# Patient Record
Sex: FEMALE | Race: BLACK OR AFRICAN AMERICAN | NOT HISPANIC OR LATINO | Employment: PART TIME | URBAN - METROPOLITAN AREA
[De-identification: names, ages, dates, MRNs, and addresses within clinical notes are randomized per-mention and may not be internally consistent; named-entity substitution may affect disease eponyms.]

---

## 2019-05-27 ENCOUNTER — HOSPITAL ENCOUNTER (EMERGENCY)
Facility: CLINIC | Age: 34
Discharge: HOME | End: 2019-05-27
Attending: EMERGENCY MEDICINE | Admitting: EMERGENCY MEDICINE
Payer: COMMERCIAL

## 2019-05-27 VITALS
DIASTOLIC BLOOD PRESSURE: 61 MMHG | RESPIRATION RATE: 16 BRPM | WEIGHT: 179 LBS | HEART RATE: 103 BPM | SYSTOLIC BLOOD PRESSURE: 111 MMHG | TEMPERATURE: 98.5 F

## 2019-05-27 DIAGNOSIS — R05.9 COUGH: ICD-10-CM

## 2019-05-27 PROCEDURE — 99283 EMERGENCY DEPT VISIT LOW MDM: CPT | Mod: Z6 | Performed by: EMERGENCY MEDICINE

## 2019-05-27 PROCEDURE — 99282 EMERGENCY DEPT VISIT SF MDM: CPT | Performed by: EMERGENCY MEDICINE

## 2019-05-27 RX ORDER — GUAIFENESIN AND DEXTROMETHORPHAN HYDROBROMIDE 600; 30 MG/1; MG/1
1 TABLET, EXTENDED RELEASE ORAL EVERY 12 HOURS
COMMUNITY
End: 2020-01-01

## 2019-05-27 ASSESSMENT — ENCOUNTER SYMPTOMS
SHORTNESS OF BREATH: 0
SINUS PRESSURE: 0
DIARRHEA: 0
ABDOMINAL PAIN: 0
HEADACHES: 1
NAUSEA: 0
RHINORRHEA: 0
SINUS PAIN: 0
COUGH: 1
CHEST TIGHTNESS: 0
SORE THROAT: 0
WHEEZING: 0
FEVER: 0
VOMITING: 0
DYSURIA: 0

## 2019-05-27 NOTE — ED NOTES
"Patient stating \" I don't have influenza I just want cough medicine\" pt refusing influenza swab. MD informed.   "

## 2019-05-27 NOTE — DISCHARGE INSTRUCTIONS
Please make an appointment to follow up with Your Primary Care Provider and OB/Gyn--[facility] (phone: [phone]) as soon as possible.    Return to the ED for worsening cough, fever, headache, shortness of breath, abdominal pain, or nausea or vomiting.

## 2019-05-27 NOTE — ED PROVIDER NOTES
[facility] [place] ([facility])    19        History     Chief Complaint   Patient presents with     Headache     Cough     Abdominal Pain     pregnant - 24 weeks per patient. Pain in right when coughing. No vaginal discharge or bleeding      The history is provided by the patient and medical records.     Estrella Trent is a 33 year old  female 23w3d pregnant by ultrasound who presents to the Emergency Department for evaluation of a cough. Patient developed a dry cough on Saturday night () which has made it difficult to sleep. When she coughs, she has a dull pain in the right upper abdomen and also notes a dull headache. Additionally, patient has bilateral lower extremity edema which she states has been normal for her in pregnancy. She did not receive a flu shot this year. Patient has been taking Mucinex DM without relief of symptoms and presents to the ED for a cough suppressant prescription. She denies chest pain, nausea, vomiting or diarrhea; no congestion, sore throat or rhinorrhea. Patient has not had contact with any known ill individuals. She does not have a history of diabetes or hypertension.      No current facility-administered medications for this encounter.      Current Outpatient Medications   Medication     dextromethorphan-guaiFENesin (MUCINEX DM)  MG 12 hr tablet     PRENATAL VIT-DOCUSATE-IRON-FA PO     No past medical history on file.    No past surgical history on file.    No family history on file.    Social History     Tobacco Use     Smoking status: Not on file   Substance Use Topics     Alcohol use: Not on file     No Known Allergies    I have reviewed the Medications, Allergies, Past Medical and Surgical History, and Social History in the Epic system.    Review of Systems   Constitutional: Negative for fever.   HENT: Negative for congestion, postnasal drip, rhinorrhea, sinus pressure, sinus pain, sneezing and sore throat.    Respiratory: Positive for  cough. Negative for chest tightness, shortness of breath and wheezing.    Cardiovascular: Negative for chest pain (right lower ribs when coughing) and leg swelling.   Gastrointestinal: Negative for abdominal pain, diarrhea, nausea and vomiting.   Genitourinary: Negative for dysuria, vaginal bleeding and vaginal discharge.   Neurological: Positive for headaches (mild).   All other systems reviewed and are negative.      Physical Exam   BP: 111/61  Pulse: 103  Temp: 98.5  F (36.9  C)  Resp: 16  Weight: 81.2 kg (179 lb)      Physical Exam   Constitutional: She is oriented to person, place, and time. She appears well-developed and well-nourished. No distress.   HENT:   Head: Normocephalic and atraumatic.   Nose: Nose normal.   Mouth/Throat: Oropharynx is clear and moist.   Eyes: Conjunctivae are normal. No scleral icterus.   Neck: Normal range of motion. Neck supple.   Cardiovascular: Normal rate, regular rhythm, normal heart sounds and intact distal pulses.   No murmur heard.  Pulmonary/Chest: Effort normal. No stridor. No tachypnea. No respiratory distress. She has decreased breath sounds. She has no wheezes. She has no rales. She exhibits no mass, no tenderness, no crepitus and no deformity.   Abdominal: Soft. Bowel sounds are normal. She exhibits no mass. There is no tenderness. There is no rigidity, no rebound, no guarding and negative Castle's sign.   Musculoskeletal: Normal range of motion. She exhibits no deformity.   Neurological: She is alert and oriented to person, place, and time.   Skin: Skin is warm and dry. No rash noted. She is not diaphoretic. No erythema.   Psychiatric: She has a normal mood and affect. Her behavior is normal.   Nursing note and vitals reviewed.      ED Course        Procedures             Critical Care time:  none             Labs Ordered and Resulted from Time of ED Arrival Up to the Time of Departure from the ED - No data to display         Assessments & Plan (with Medical Decision  Making)   Estrella Trent is a 33 year old  female 23w3d pregnant by ultrasound who presents to the Emergency Department for evaluation of a cough.    Ddx: influenza, viral URI, allergic rhinitis, cholecystitis, rib pain, right pleural effusion    Patient refused all testing including cxr, flu swab, blood work for liver enzymes, trial of albuterol neb. She is otherwise healthy and denies fevers. No abdominal or chest wall ttp. Lungs clear. No resp distress. No asymmetric leg swelling. Did discuss risks to pregnancy with undiagnosed influenza, pneumonia, or gallbladder infection. (However, low suspicion for acute bacterial infection.)  Patient requesting a cough suppressant initially but I recommended discussing this with her OB. Patient verbalized understanding of risks of foregoing diagnostic testing and did not want to go to OB triage for eval. Stated that she changed her mind and would prefer to see her primary tomorrow. Discharged with return precautions.      I have reviewed the nursing notes.    I have reviewed the findings, diagnosis, plan and need for follow up with the patient.       Medication List      There are no discharge medications for this visit.         Final diagnoses:   Cough   I, Heavenly Smith, am serving as a trained medical scribe to document services personally performed by Gaby Muhammad MD, based on the provider's statements to me.   IGaby MD, was physically present and have reviewed and verified the accuracy of this note documented by Heavenly Smith.    2019   [facility], EMERGENCY DEPARTMENT     Gaby Muhammad MD  [id number]

## 2019-05-27 NOTE — ED AVS SNAPSHOT
[facility], [place], Emergency Department  [address]  [facility] [address]  Phone:  [phone]  Fax:  [phone]                                    Estrella Trent   MRN: [id number]    Department:  [facility], Emergency Department   Date of Visit:  5/27/2019           After Visit Summary Signature Page    I have received my discharge instructions, and my questions have been answered. I have discussed any challenges I see with this plan with the nurse or doctor.    ..........................................................................................................................................  Patient/Patient Representative Signature      ..........................................................................................................................................  Patient Representative Print Name and Relationship to Patient    ..................................................               ................................................  Date                                   Time    ..........................................................................................................................................  Reviewed by Signature/Title    ...................................................              ..............................................  Date                                               Time          22EPIC Rev 08/18

## 2020-01-01 ENCOUNTER — APPOINTMENT (OUTPATIENT)
Dept: ULTRASOUND IMAGING | Facility: CLINIC | Age: 35
End: 2020-01-01
Attending: EMERGENCY MEDICINE
Payer: COMMERCIAL

## 2020-01-01 ENCOUNTER — HOSPITAL ENCOUNTER (EMERGENCY)
Facility: CLINIC | Age: 35
Discharge: HOME | End: 2020-01-01
Attending: EMERGENCY MEDICINE | Admitting: EMERGENCY MEDICINE
Payer: COMMERCIAL

## 2020-01-01 VITALS
RESPIRATION RATE: 12 BRPM | HEART RATE: 81 BPM | SYSTOLIC BLOOD PRESSURE: 113 MMHG | DIASTOLIC BLOOD PRESSURE: 82 MMHG | WEIGHT: 166.8 LBS | TEMPERATURE: 98.1 F | OXYGEN SATURATION: 97 %

## 2020-01-01 DIAGNOSIS — N93.8 DYSFUNCTIONAL UTERINE BLEEDING: ICD-10-CM

## 2020-01-01 DIAGNOSIS — N30.01 ACUTE CYSTITIS WITH HEMATURIA: ICD-10-CM

## 2020-01-01 LAB
ALBUMIN SERPL-MCNC: 3.7 G/DL (ref 3.4–5)
ALBUMIN UR-MCNC: 100 MG/DL
ALP SERPL-CCNC: 61 U/L (ref 40–150)
ALT SERPL W P-5'-P-CCNC: 21 U/L (ref 0–50)
ANION GAP SERPL CALCULATED.3IONS-SCNC: 5 MMOL/L (ref 3–14)
APPEARANCE UR: CLEAR
AST SERPL W P-5'-P-CCNC: 15 U/L (ref 0–45)
BACTERIA #/AREA URNS HPF: ABNORMAL /HPF
BASOPHILS # BLD AUTO: 0 10E9/L (ref 0–0.2)
BASOPHILS NFR BLD AUTO: 0.2 %
BILIRUB SERPL-MCNC: 0.4 MG/DL (ref 0.2–1.3)
BILIRUB UR QL STRIP: NEGATIVE
BUN SERPL-MCNC: 7 MG/DL (ref 7–30)
CALCIUM SERPL-MCNC: 8.1 MG/DL (ref 8.5–10.1)
CHLORIDE SERPL-SCNC: 110 MMOL/L (ref 94–109)
CO2 SERPL-SCNC: 24 MMOL/L (ref 20–32)
COLOR UR AUTO: ABNORMAL
CREAT SERPL-MCNC: 0.62 MG/DL (ref 0.52–1.04)
DIFFERENTIAL METHOD BLD: NORMAL
EOSINOPHIL # BLD AUTO: 0.1 10E9/L (ref 0–0.7)
EOSINOPHIL NFR BLD AUTO: 2 %
ERYTHROCYTE [DISTWIDTH] IN BLOOD BY AUTOMATED COUNT: 12.3 % (ref 10–15)
GFR SERPL CREATININE-BSD FRML MDRD: >90 ML/MIN/{1.73_M2}
GLUCOSE SERPL-MCNC: 98 MG/DL (ref 70–99)
GLUCOSE UR STRIP-MCNC: NEGATIVE MG/DL
HCG UR QL: NEGATIVE
HCT VFR BLD AUTO: 38.6 % (ref 35–47)
HGB BLD-MCNC: 13.1 G/DL (ref 11.7–15.7)
HGB UR QL STRIP: ABNORMAL
IMM GRANULOCYTES # BLD: 0 10E9/L (ref 0–0.4)
IMM GRANULOCYTES NFR BLD: 0.4 %
INR PPP: 0.98 (ref 0.86–1.14)
KETONES UR STRIP-MCNC: NEGATIVE MG/DL
LEUKOCYTE ESTERASE UR QL STRIP: ABNORMAL
LYMPHOCYTES # BLD AUTO: 1.3 10E9/L (ref 0.8–5.3)
LYMPHOCYTES NFR BLD AUTO: 26.2 %
MCH RBC QN AUTO: 30.2 PG (ref 26.5–33)
MCHC RBC AUTO-ENTMCNC: 33.9 G/DL (ref 31.5–36.5)
MCV RBC AUTO: 89 FL (ref 78–100)
MONOCYTES # BLD AUTO: 0.3 10E9/L (ref 0–1.3)
MONOCYTES NFR BLD AUTO: 5 %
NEUTROPHILS # BLD AUTO: 3.3 10E9/L (ref 1.6–8.3)
NEUTROPHILS NFR BLD AUTO: 66.2 %
NITRATE UR QL: NEGATIVE
NRBC # BLD AUTO: 0 10*3/UL
NRBC BLD AUTO-RTO: 0 /100
PH UR STRIP: 8 PH (ref 5–7)
PLATELET # BLD AUTO: 237 10E9/L (ref 150–450)
POTASSIUM SERPL-SCNC: 4.1 MMOL/L (ref 3.4–5.3)
PROT SERPL-MCNC: 7.2 G/DL (ref 6.8–8.8)
RBC # BLD AUTO: 4.34 10E12/L (ref 3.8–5.2)
RBC #/AREA URNS AUTO: >182 /HPF (ref 0–2)
RENAL EPI CELLS #/AREA URNS HPF: <1 /HPF
SODIUM SERPL-SCNC: 139 MMOL/L (ref 133–144)
SOURCE: ABNORMAL
SP GR UR STRIP: 1 (ref 1–1.03)
SPECIMEN SOURCE: NORMAL
SQUAMOUS #/AREA URNS AUTO: 3 /HPF (ref 0–1)
TRANS CELLS #/AREA URNS HPF: 1 /HPF (ref 0–1)
UROBILINOGEN UR STRIP-MCNC: NORMAL MG/DL (ref 0–2)
WBC # BLD AUTO: 5 10E9/L (ref 4–11)
WBC #/AREA URNS AUTO: 56 /HPF (ref 0–5)
WBC CLUMPS #/AREA URNS HPF: PRESENT /HPF
WET PREP SPEC: NORMAL

## 2020-01-01 PROCEDURE — 85610 PROTHROMBIN TIME: CPT | Performed by: EMERGENCY MEDICINE

## 2020-01-01 PROCEDURE — 87210 SMEAR WET MOUNT SALINE/INK: CPT | Performed by: EMERGENCY MEDICINE

## 2020-01-01 PROCEDURE — 99284 EMERGENCY DEPT VISIT MOD MDM: CPT | Mod: 25 | Performed by: EMERGENCY MEDICINE

## 2020-01-01 PROCEDURE — 76830 TRANSVAGINAL US NON-OB: CPT

## 2020-01-01 PROCEDURE — 87591 N.GONORRHOEAE DNA AMP PROB: CPT | Performed by: EMERGENCY MEDICINE

## 2020-01-01 PROCEDURE — 85025 COMPLETE CBC W/AUTO DIFF WBC: CPT | Performed by: EMERGENCY MEDICINE

## 2020-01-01 PROCEDURE — 81025 URINE PREGNANCY TEST: CPT | Performed by: EMERGENCY MEDICINE

## 2020-01-01 PROCEDURE — 80053 COMPREHEN METABOLIC PANEL: CPT | Performed by: EMERGENCY MEDICINE

## 2020-01-01 PROCEDURE — 87086 URINE CULTURE/COLONY COUNT: CPT | Performed by: EMERGENCY MEDICINE

## 2020-01-01 PROCEDURE — 99284 EMERGENCY DEPT VISIT MOD MDM: CPT | Mod: Z6 | Performed by: EMERGENCY MEDICINE

## 2020-01-01 PROCEDURE — 87491 CHLMYD TRACH DNA AMP PROBE: CPT | Performed by: EMERGENCY MEDICINE

## 2020-01-01 PROCEDURE — 81001 URINALYSIS AUTO W/SCOPE: CPT | Performed by: EMERGENCY MEDICINE

## 2020-01-01 RX ORDER — NITROFURANTOIN 25; 75 MG/1; MG/1
100 CAPSULE ORAL 2 TIMES DAILY
Qty: 6 CAPSULE | Refills: 0 | Status: SHIPPED | OUTPATIENT
Start: 2020-01-01 | End: 2022-06-30

## 2020-01-01 ASSESSMENT — ENCOUNTER SYMPTOMS
FEVER: 0
DIARRHEA: 0
DYSURIA: 0
HEADACHES: 1
BACK PAIN: 1
SHORTNESS OF BREATH: 0
CONSTIPATION: 0
ABDOMINAL PAIN: 0
DIAPHORESIS: 1

## 2020-01-01 NOTE — ED AVS SNAPSHOT
[facility], [place], Emergency Department  [address]  [facility] [address]  Phone:  [phone]  Fax:  [phone]                                    Estrella Trent   MRN: [id number]    Department:  [facility], Emergency Department   Date of Visit:  1/1/2020           After Visit Summary Signature Page    I have received my discharge instructions, and my questions have been answered. I have discussed any challenges I see with this plan with the nurse or doctor.    ..........................................................................................................................................  Patient/Patient Representative Signature      ..........................................................................................................................................  Patient Representative Print Name and Relationship to Patient    ..................................................               ................................................  Date                                   Time    ..........................................................................................................................................  Reviewed by Signature/Title    ...................................................              ..............................................  Date                                               Time          22EPIC Rev 08/18

## 2020-01-01 NOTE — ED PROVIDER NOTES
[facility] - [place] EMERGENCY DEPARTMENT ([place])  20    History     Chief Complaint   Patient presents with     Vaginal Bleeding     Pt states she started bleding 10 days ago.  Pt states the bleeding has been heavier since last night and has used 4 pads since 8am this morning.  Pt c/o back pain      History was provided by the patient and medical records.      Estrella Trent is a 34 year old female with a history notable for status post  section (2019) who presents for evaluation of vaginal bleeding.  Patient states her bleeding began 10 days ago and has recently become heavier.  Since 8 AM this morning, the patient has changed her pad 3 times.  Patient also complains of low back pain.  She reports 3 to 4 days of urinary urgency.  The patient denies abdominal pain, dysuria, diarrhea, constipation, chest pain, shortness of breath, fever or unusual vaginal discharge. She did feel lightheaded earlier today.  The patient was evaluated on 2019 for similar symptoms.  At that time she was noted to have a finding on pelvic ultrasound that was unclear if it was a uterine polyp versus thrombus versus retained products of conception and was recommended follow-up ultrasound and possible hysteroscopy.  She did not have the ultrasound as scheduled.  On 2019 the patient received a Depo-provera shot for contraception.       PAST MEDICAL HISTORY  History reviewed. No pertinent past medical history.  PAST SURGICAL HISTORY  Past Surgical History:   Procedure Laterality Date     GYN SURGERY            FAMILY HISTORY  History reviewed. No pertinent family history.  SOCIAL HISTORY  Social History     Tobacco Use     Smoking status: Never Smoker     Smokeless tobacco: Never Used   Substance Use Topics     Alcohol use: Not Currently     MEDICATIONS  No current facility-administered medications for this encounter.      No current outpatient medications on file.     ALLERGIES  No Known  Allergies    I have reviewed the Medications, Allergies, Past Medical and Surgical History, and Social History in the Epic system.    Review of Systems   Constitutional: Positive for diaphoresis. Negative for fever.   Respiratory: Negative for shortness of breath.    Cardiovascular: Negative for chest pain.   Gastrointestinal: Negative for abdominal pain, constipation and diarrhea.   Genitourinary: Positive for urgency and vaginal bleeding. Negative for dysuria and vaginal discharge.   Musculoskeletal: Positive for back pain (Right-sided).   Neurological: Positive for headaches.   All other systems reviewed and are negative.      Physical Exam      /86   Temp 98.1  F (36.7  C) (Oral)   Resp 18   Wt 75.7 kg (166 lb 12.8 oz)   LMP 2019   SpO2 98%   Breastfeeding No       Physical Exam  General: patient is alert and oriented and in no acute distress   Head: atraumatic and normocephalic   EENT: moist mucus membranes, sclera anicteric   Neck: supple   Cardiovascular: regular rate and rhythm, extremities warm and well perfused, no lower extremity edema  Pulmonary: lungs clear to auscultation bilaterally   Abdomen: soft, non-tender, non-distended  Gyn: normal external genitalia, trace bleeding from cervical os, no mucopurulent cervical discharge, no CMT  Musculoskeletal: normal range of motion   Neurological: alert and oriented, moving all extremities symmetrically, gait normal   Skin: warm, dry, no purpura or petechial lesions noted    ED Course   2:43 PM  The patient was seen and examined by Dr. Rakel Power in Room ED 06.        Procedures             Critical Care time:  none             Labs Ordered and Resulted from Time of ED Arrival Up to the Time of Departure from the ED - No data to display         Assessments & Plan (with Medical Decision Making)   Estrella Trent is a 34-year-old  female who presents with vaginal bleeding and low back pain.  She did have a scheduled  section September  6, 2019 that was complicated by postpartum hemorrhage.  She has had intermittent heavy vaginal bleeding since that time and did have a repeat ultrasound which showed an intrauterine polyp versus retained products of conception.  She had been noted to have trichomonas which has been untreated and was recommended to treat prior infection and repeat ultrasound however she never had a repeat ultrasound.  She currently is hemodynamically stable and afebrile.  She has no abdominal tenderness on exam or back pain.  She does report some urinary symptoms and UA today is suggestive of UTI.  Does not have any evidence of pyelonephritis on exam.  On pelvic exam she has trace bleeding from the cervical os without profuse bleeding.  Her hemoglobin is 13.1.  She has not noticed any other unusual bruising or bleeding to suggest systemic coagulopathy.  She did go for a pelvic ultrasound which shows uterine fibroids as well as possible thrombus versus retained products.  Patient did recently received her first Depo shot and certainly may have some dysfunctional uterine bleeding in the setting of recent Depo-Provera versus fibroid versus polyp versus retained products.  I did discuss with gynecology and at this point recommended following up with her gynecologist outpatient for hysteroscopy.  We will plan to treat with nitrofurantoin for UTI.  She was given close return precautions for heavy bleeding as well as worsening signs of infection and voiced understanding.    This part of the medical record was transcribed by Romain Leung, Medical Scribe, from a dictation done by Rakel Power MD.     I have reviewed the nursing notes.    I have reviewed the findings, diagnosis, plan and need for follow up with the patient.    Discharge Medication List as of 1/1/2020  6:14 PM      START taking these medications    Details   nitroFURantoin macrocrystal-monohydrate (MACROBID) 100 MG capsule Take 1 capsule (100 mg) by mouth 2 times daily, Disp-6  capsule, R-0, Local Print             Final diagnoses:   Acute cystitis with hematuria   Dysfunctional uterine bleeding     IRomain, am serving as a trained medical scribe to document services personally performed by Rakel Power MD, based on the provider's statements to me.      IRakel MD, was physically present and have reviewed and verified the accuracy of this note documented by Romain Leung.     1/1/2020   [facility], [place], EMERGENCY DEPARTMENT     Rakel Power MD  01/01/20 [id number]

## 2020-01-02 LAB
BACTERIA SPEC CULT: NORMAL
C TRACH DNA SPEC QL NAA+PROBE: NEGATIVE
Lab: NORMAL
N GONORRHOEA DNA SPEC QL NAA+PROBE: NEGATIVE
SPECIMEN SOURCE: NORMAL

## 2020-01-02 NOTE — RESULT ENCOUNTER NOTE
Final result for both N. Gonorrhoeae PCR and Chlamydia Trachomatis PCR are NEGATIVE.  No treatment or change in treatment per [place] ED Lab Result protocol.

## 2020-01-02 NOTE — DISCHARGE INSTRUCTIONS
Please make an appointment to follow up with your gynecologist as soon as possible for hysteroscopy.       Take nitrofurantoin as prescribed.      If you have worsening symptoms including heavy bleeding (soaking through 1 pad per hour), feeling like you are going to pass out, fevers or flank pain, return to the emergency department for re-evaluation.

## 2020-01-03 NOTE — RESULT ENCOUNTER NOTE
Final urine culture report is NEGATIVE per [place] ED Lab Result protocol.    If NEGATIVE result, no change in treatment, per [place] ED Lab Result protocol.

## 2022-06-30 ENCOUNTER — HOSPITAL ENCOUNTER (EMERGENCY)
Facility: CLINIC | Age: 37
Discharge: HOME | End: 2022-06-30
Attending: EMERGENCY MEDICINE | Admitting: EMERGENCY MEDICINE
Payer: COMMERCIAL

## 2022-06-30 ENCOUNTER — APPOINTMENT (OUTPATIENT)
Dept: CT IMAGING | Facility: CLINIC | Age: 37
End: 2022-06-30
Attending: EMERGENCY MEDICINE
Payer: COMMERCIAL

## 2022-06-30 VITALS
SYSTOLIC BLOOD PRESSURE: 118 MMHG | BODY MASS INDEX: 27.55 KG/M2 | DIASTOLIC BLOOD PRESSURE: 88 MMHG | HEIGHT: 65 IN | WEIGHT: 165.34 LBS | TEMPERATURE: 98.4 F | RESPIRATION RATE: 16 BRPM | HEART RATE: 71 BPM | OXYGEN SATURATION: 95 %

## 2022-06-30 DIAGNOSIS — Z20.822 COVID-19 RULED OUT BY LABORATORY TESTING: ICD-10-CM

## 2022-06-30 DIAGNOSIS — R10.12 LUQ ABDOMINAL PAIN: ICD-10-CM

## 2022-06-30 LAB
ALBUMIN SERPL-MCNC: 3.3 G/DL (ref 3.4–5)
ALBUMIN UR-MCNC: NEGATIVE MG/DL
ALP SERPL-CCNC: 52 U/L (ref 40–150)
ALT SERPL W P-5'-P-CCNC: 20 U/L (ref 0–50)
ANION GAP SERPL CALCULATED.3IONS-SCNC: 5 MMOL/L (ref 3–14)
APPEARANCE UR: CLEAR
AST SERPL W P-5'-P-CCNC: 18 U/L (ref 0–45)
BASOPHILS # BLD AUTO: 0 10E3/UL (ref 0–0.2)
BASOPHILS NFR BLD AUTO: 1 %
BILIRUB SERPL-MCNC: 0.2 MG/DL (ref 0.2–1.3)
BILIRUB UR QL STRIP: NEGATIVE
BUN SERPL-MCNC: 9 MG/DL (ref 7–30)
CALCIUM SERPL-MCNC: 8.7 MG/DL (ref 8.5–10.1)
CHLORIDE BLD-SCNC: 108 MMOL/L (ref 94–109)
CO2 SERPL-SCNC: 26 MMOL/L (ref 20–32)
COLOR UR AUTO: ABNORMAL
CREAT SERPL-MCNC: 0.62 MG/DL (ref 0.52–1.04)
D DIMER PPP FEU-MCNC: 0.39 UG/ML FEU (ref 0–0.5)
EOSINOPHIL # BLD AUTO: 0.1 10E3/UL (ref 0–0.7)
EOSINOPHIL NFR BLD AUTO: 2 %
ERYTHROCYTE [DISTWIDTH] IN BLOOD BY AUTOMATED COUNT: 12.8 % (ref 10–15)
FLUAV RNA SPEC QL NAA+PROBE: NEGATIVE
FLUBV RNA RESP QL NAA+PROBE: NEGATIVE
GFR SERPL CREATININE-BSD FRML MDRD: >90 ML/MIN/1.73M2
GLUCOSE BLD-MCNC: 109 MG/DL (ref 70–99)
GLUCOSE UR STRIP-MCNC: NEGATIVE MG/DL
HCG UR QL: NEGATIVE
HCT VFR BLD AUTO: 34.4 % (ref 35–47)
HGB BLD-MCNC: 11.7 G/DL (ref 11.7–15.7)
HGB UR QL STRIP: ABNORMAL
IMM GRANULOCYTES # BLD: 0 10E3/UL
IMM GRANULOCYTES NFR BLD: 0 %
INTERNAL QC OK POCT: NORMAL
KETONES UR STRIP-MCNC: NEGATIVE MG/DL
LACTATE SERPL-SCNC: 0.8 MMOL/L (ref 0.7–2)
LEUKOCYTE ESTERASE UR QL STRIP: NEGATIVE
LIPASE SERPL-CCNC: 235 U/L (ref 73–393)
LYMPHOCYTES # BLD AUTO: 1.2 10E3/UL (ref 0.8–5.3)
LYMPHOCYTES NFR BLD AUTO: 25 %
MCH RBC QN AUTO: 27.8 PG (ref 26.5–33)
MCHC RBC AUTO-ENTMCNC: 34 G/DL (ref 31.5–36.5)
MCV RBC AUTO: 82 FL (ref 78–100)
MONOCYTES # BLD AUTO: 0.4 10E3/UL (ref 0–1.3)
MONOCYTES NFR BLD AUTO: 7 %
MUCOUS THREADS #/AREA URNS LPF: PRESENT /LPF
NEUTROPHILS # BLD AUTO: 3.1 10E3/UL (ref 1.6–8.3)
NEUTROPHILS NFR BLD AUTO: 65 %
NITRATE UR QL: NEGATIVE
NRBC # BLD AUTO: 0 10E3/UL
NRBC BLD AUTO-RTO: 0 /100
PH UR STRIP: 5 [PH] (ref 5–7)
PLATELET # BLD AUTO: 286 10E3/UL (ref 150–450)
POCT KIT EXPIRATION DATE: NORMAL
POCT KIT LOT NUMBER: NORMAL
POTASSIUM BLD-SCNC: 4 MMOL/L (ref 3.4–5.3)
PROT SERPL-MCNC: 7 G/DL (ref 6.8–8.8)
RBC # BLD AUTO: 4.21 10E6/UL (ref 3.8–5.2)
RBC URINE: 1 /HPF
SARS-COV-2 RNA RESP QL NAA+PROBE: NEGATIVE
SODIUM SERPL-SCNC: 139 MMOL/L (ref 133–144)
SP GR UR STRIP: 1.02 (ref 1–1.03)
SQUAMOUS EPITHELIAL: 1 /HPF
UROBILINOGEN UR STRIP-MCNC: NORMAL MG/DL
WBC # BLD AUTO: 4.8 10E3/UL (ref 4–11)
WBC URINE: 1 /HPF

## 2022-06-30 PROCEDURE — 93010 ELECTROCARDIOGRAM REPORT: CPT | Performed by: EMERGENCY MEDICINE

## 2022-06-30 PROCEDURE — 87636 SARSCOV2 & INF A&B AMP PRB: CPT | Mod: 59 | Performed by: EMERGENCY MEDICINE

## 2022-06-30 PROCEDURE — C9803 HOPD COVID-19 SPEC COLLECT: HCPCS | Performed by: EMERGENCY MEDICINE

## 2022-06-30 PROCEDURE — 250N000009 HC RX 250: Performed by: EMERGENCY MEDICINE

## 2022-06-30 PROCEDURE — 80053 COMPREHEN METABOLIC PANEL: CPT | Performed by: EMERGENCY MEDICINE

## 2022-06-30 PROCEDURE — 99285 EMERGENCY DEPT VISIT HI MDM: CPT | Mod: 25 | Performed by: EMERGENCY MEDICINE

## 2022-06-30 PROCEDURE — 81025 URINE PREGNANCY TEST: CPT | Performed by: EMERGENCY MEDICINE

## 2022-06-30 PROCEDURE — 83605 ASSAY OF LACTIC ACID: CPT | Performed by: EMERGENCY MEDICINE

## 2022-06-30 PROCEDURE — 85004 AUTOMATED DIFF WBC COUNT: CPT | Performed by: EMERGENCY MEDICINE

## 2022-06-30 PROCEDURE — 74177 CT ABD & PELVIS W/CONTRAST: CPT

## 2022-06-30 PROCEDURE — 250N000011 HC RX IP 250 OP 636: Performed by: EMERGENCY MEDICINE

## 2022-06-30 PROCEDURE — 85379 FIBRIN DEGRADATION QUANT: CPT | Performed by: EMERGENCY MEDICINE

## 2022-06-30 PROCEDURE — 36415 COLL VENOUS BLD VENIPUNCTURE: CPT | Performed by: EMERGENCY MEDICINE

## 2022-06-30 PROCEDURE — 250N000013 HC RX MED GY IP 250 OP 250 PS 637: Performed by: EMERGENCY MEDICINE

## 2022-06-30 PROCEDURE — 83690 ASSAY OF LIPASE: CPT | Performed by: EMERGENCY MEDICINE

## 2022-06-30 PROCEDURE — 81001 URINALYSIS AUTO W/SCOPE: CPT | Performed by: EMERGENCY MEDICINE

## 2022-06-30 PROCEDURE — 87636 SARSCOV2 & INF A&B AMP PRB: CPT | Performed by: EMERGENCY MEDICINE

## 2022-06-30 RX ORDER — IOPAMIDOL 755 MG/ML
100 INJECTION, SOLUTION INTRAVASCULAR ONCE
Status: COMPLETED | OUTPATIENT
Start: 2022-06-30 | End: 2022-06-30

## 2022-06-30 RX ORDER — IBUPROFEN 800 MG/1
800 TABLET, FILM COATED ORAL ONCE
Status: COMPLETED | OUTPATIENT
Start: 2022-06-30 | End: 2022-06-30

## 2022-06-30 RX ADMIN — IOPAMIDOL 81 ML: 755 INJECTION, SOLUTION INTRAVENOUS at 13:49

## 2022-06-30 RX ADMIN — SODIUM CHLORIDE 60 ML: 9 INJECTION, SOLUTION INTRAVENOUS at 13:49

## 2022-06-30 RX ADMIN — IBUPROFEN 800 MG: 800 TABLET, FILM COATED ORAL at 11:57

## 2022-06-30 ASSESSMENT — ENCOUNTER SYMPTOMS
FEVER: 0
VOMITING: 0
NAUSEA: 1
ABDOMINAL PAIN: 1
SHORTNESS OF BREATH: 0
FLANK PAIN: 0

## 2022-06-30 NOTE — DISCHARGE INSTRUCTIONS
CT of your chest, abdomen, and pelvis is unremarkable apart from a small umbilical hernia.  This is unlikely to be causing your pain.  Your laboratory studies, including COVID, are essentially negative.  Your glucose is slightly high at 109.  I have placed a referral for primary care follow-up.  Use ibuprofen 600 mg every 6 hours with food or Tylenol 1000 mg every 8 hours as needed for pain.  Heat or ice to the sore areas may help.  Return for fevers, vomiting, worsening pain, or other concerns

## 2022-06-30 NOTE — ED TRIAGE NOTES
Patient reports left upper abdominal pain that started yesterday. Denies pain or burning on urination, urinary frequency, or blood in urine. Denies nausea, vomiting, or diarrhea. Took Tylenol at 6am without relief. Denies fever.      Triage Assessment     Row Name 06/30/22 1114       Triage Assessment (Adult)    Airway WDL WDL       Respiratory WDL    Respiratory WDL WDL       Skin Circulation/Temperature WDL    Skin Circulation/Temperature WDL WDL       Cardiac WDL    Cardiac WDL WDL       Peripheral/Neurovascular WDL    Peripheral Neurovascular WDL WDL       Cognitive/Neuro/Behavioral WDL    Cognitive/Neuro/Behavioral WDL WDL

## 2022-06-30 NOTE — ED PROVIDER NOTES
[facility] ([facility])  22    History     Chief Complaint   Patient presents with     Abdominal Pain     HPI  Estrella Trent is an otherwise healthy 36 year old female who presents to the ED for evaluation of left upper quadrant abdominal pain.  She describes this abdominal pain as very sharp and reports that it started last night while she was playing with her kids.  She does endorse some associated nausea, denies vomiting.  She states that this pain is worse when she takes a deep breath.  She denies shortness of breath or prior similar incidents.  She denies fever.  Patient denies chance of pregnancy and reports that her LMP was .  She denies urinary symptoms or flank pain.  Patient reports that she is otherwise healthy.  She later adds that she has a cough.    Past Medical History  No past medical history on file.  Past Surgical History:   Procedure Laterality Date     GYN SURGERY      2019      No current outpatient medications on file.    No Known Allergies  Family History  No family history on file.  Social History   Social History     Tobacco Use     Smoking status: Never Smoker     Smokeless tobacco: Never Used   Substance Use Topics     Alcohol use: Not Currently     Drug use: Not Currently      Past medical history, past surgical history, medications, allergies, family history, and social history were reviewed with the patient. No additional pertinent items.       Review of Systems   Constitutional: Negative for fever.   Respiratory: Negative for shortness of breath.    Gastrointestinal: Positive for abdominal pain (LUQ) and nausea. Negative for vomiting.   Genitourinary: Negative.  Negative for flank pain.   All other systems reviewed and are negative.    A complete review of systems was performed with pertinent positives and negatives noted in the HPI, and all other systems negative.    Physical Exam   BP: 113/81  Pulse: 77  Temp: 98.4  F (36.9  C)  Resp:  "16  Height: 165.1 cm (5' 5\")  Weight: 75 kg (165 lb 5.5 oz)  SpO2: 95 %  Physical Exam  Abdominal:            Physical Exam   Constitutional:   well nourished, well developed, resting comfortably   HENT:   Head: Normocephalic and atraumatic.   Eyes: Conjunctivae are normal. Pupils are equal, round, and reactive to light.   Cardiovascular: regular rate and rhythm without murmurs or gallops  Pulmonary/Chest: Clear to auscultation bilaterally, with no wheezes or retractions. No respiratory distress.  Normal work of breathing  GI: Soft with good bowel sounds.  Tender LUQ non-distended, with no guarding, no rebound, no peritoneal signs.  No right upper quadrant pain, no right lower quadrant pain, Castle sign is negative  Back:  No bony or CVA tenderness   Musculoskeletal:  no edema  Skin: Skin is warm and dry. No rash noted.   Neurological: alert and oriented to person, place, and time. Nonfocal exam  Psychiatric:  normal mood and affect.    ED Course      Procedures   11:27 AM  The patient was seen and examined by Sujata Jackson MD in Room ED02.        The medical record was reviewed and interpreted.  Current labs reviewed and interpreted.  Current images reviewed and interpreted: see below.  EKG reviewed and interpreted: see below.           EKG Interpretation:      Interpreted by Sujata Jackson MD  Time reviewed:1145 am   Symptoms at time of EKG: see hpi   Rhythm: Normal sinus   Rate: Normal  Axis: Normal  Ectopy: None  Conduction: Normal  ST Segments/ T Waves: No ST-T wave changes and No acute ischemic changes  Q Waves: None  Comparison to prior: No old EKG available    Clinical Impression: Normal sinus rhythm, rate of 74 bpm with no acute ischemic changes.              Results for orders placed or performed during the hospital encounter of 06/30/22   CT Chest/Abdomen/Pelvis w Contrast     Status: None    Narrative    CT CHEST/ABDOMEN/PELVIS W CONTRAST 6/30/2022 2:06 PM    CLINICAL HISTORY: Left upper quadrant " abdominal pain, pleuritic chest  pain, cough    TECHNIQUE: CT scan of the chest, abdomen, and pelvis was performed  following injection of IV contrast. Multiplanar reformats were  obtained. Dose reduction techniques were used.   CONTRAST: 81mL Isovue 370    COMPARISON: Pelvic ultrasound 1/1/2020    FINDINGS:   LUNGS AND PLEURA: Scattered linear atelectasis throughout both lungs.  No focal airspace consolidation or pleural effusion.    MEDIASTINUM/AXILLAE: Normal.    CORONARY ARTERY CALCIFICATION: None.    HEPATOBILIARY: Normal.    PANCREAS: Normal.    SPLEEN: Normal.    ADRENAL GLANDS: Normal.    KIDNEYS/BLADDER: No hydronephrosis. Urinary bladder is unremarkable.    BOWEL: No obstruction or inflammatory change. Normal appendix.    PELVIC ORGANS: Likely small fundal uterine fibroid. 1.8 cm right  adnexal cyst, no specific follow-up recommended.    ADDITIONAL FINDINGS: Diastasis recti with small superimposed fat and  small bowel containing umbilical hernia.    MUSCULOSKELETAL: No acute bony abnormality.      Impression    IMPRESSION:  1.  No acute abnormality in the chest, abdomen or pelvis.    YAIR LUCIANO MD         SYSTEM ID:  [id number]   Comprehensive metabolic panel     Status: Abnormal   Result Value Ref Range    Sodium 139 133 - 144 mmol/L    Potassium 4.0 3.4 - 5.3 mmol/L    Chloride 108 94 - 109 mmol/L    Carbon Dioxide (CO2) 26 20 - 32 mmol/L    Anion Gap 5 3 - 14 mmol/L    Urea Nitrogen 9 7 - 30 mg/dL    Creatinine 0.62 0.52 - 1.04 mg/dL    Calcium 8.7 8.5 - 10.1 mg/dL    Glucose 109 (H) 70 - 99 mg/dL    Alkaline Phosphatase 52 40 - 150 U/L    AST 18 0 - 45 U/L    ALT 20 0 - 50 U/L    Protein Total 7.0 6.8 - 8.8 g/dL    Albumin 3.3 (L) 3.4 - 5.0 g/dL    Bilirubin Total 0.2 0.2 - 1.3 mg/dL    GFR Estimate >90 >60 mL/min/1.73m2   Lipase     Status: Normal   Result Value Ref Range    Lipase 235 73 - 393 U/L   Lactic acid whole blood     Status: Normal   Result Value Ref Range    Lactic Acid 0.8 0.7 - 2.0  mmol/L   UA with Microscopic reflex to Culture     Status: Abnormal    Specimen: Urine, Midstream   Result Value Ref Range    Color Urine Light Yellow Colorless, Straw, Light Yellow, Yellow    Appearance Urine Clear Clear    Glucose Urine Negative Negative mg/dL    Bilirubin Urine Negative Negative    Ketones Urine Negative Negative mg/dL    Specific Gravity Urine 1.020 1.003 - 1.035    Blood Urine Small (A) Negative    pH Urine 5.0 5.0 - 7.0    Protein Albumin Urine Negative Negative mg/dL    Urobilinogen Urine Normal Normal, 2.0 mg/dL    Nitrite Urine Negative Negative    Leukocyte Esterase Urine Negative Negative    Mucus Urine Present (A) None Seen /LPF    RBC Urine 1 <=2 /HPF    WBC Urine 1 <=5 /HPF    Squamous Epithelials Urine 1 <=1 /HPF    Narrative    Urine Culture not indicated   D dimer quantitative     Status: Normal   Result Value Ref Range    D-Dimer Quantitative 0.39 0.00 - 0.50 ug/mL FEU    Narrative    This D-dimer assay is intended for use in conjunction with a clinical pretest probability assessment model to exclude pulmonary embolism (PE) and deep venous thrombosis (DVT) in outpatients suspected of PE or DVT. The cut-off value is 0.50 ug/mL FEU.   CBC with platelets and differential     Status: Abnormal   Result Value Ref Range    WBC Count 4.8 4.0 - 11.0 10e3/uL    RBC Count 4.21 3.80 - 5.20 10e6/uL    Hemoglobin 11.7 11.7 - 15.7 g/dL    Hematocrit 34.4 (L) 35.0 - 47.0 %    MCV 82 78 - 100 fL    MCH 27.8 26.5 - 33.0 pg    MCHC 34.0 31.5 - 36.5 g/dL    RDW 12.8 10.0 - 15.0 %    Platelet Count 286 150 - 450 10e3/uL    % Neutrophils 65 %    % Lymphocytes 25 %    % Monocytes 7 %    % Eosinophils 2 %    % Basophils 1 %    % Immature Granulocytes 0 %    NRBCs per 100 WBC 0 <1 /100    Absolute Neutrophils 3.1 1.6 - 8.3 10e3/uL    Absolute Lymphocytes 1.2 0.8 - 5.3 10e3/uL    Absolute Monocytes 0.4 0.0 - 1.3 10e3/uL    Absolute Eosinophils 0.1 0.0 - 0.7 10e3/uL    Absolute Basophils 0.0 0.0 - 0.2  10e3/uL    Absolute Immature Granulocytes 0.0 <=0.4 10e3/uL    Absolute NRBCs 0.0 10e3/uL   Symptomatic; Yes; 6/29/2022 Influenza A/B & SARS-CoV2 (COVID-19) Virus PCR Multiplex Nasopharyngeal     Status: Normal    Specimen: Nasopharyngeal; Swab   Result Value Ref Range    Influenza A PCR Negative Negative    Influenza B PCR Negative Negative    SARS CoV2 PCR Negative Negative    Narrative    Testing was performed using the juan carlos SARS-CoV-2 & Influenza A/B Assay on the juan carlos Roxanne System. This test should be ordered for the detection of SARS-CoV-2 and influenza viruses in individuals who meet clinical and/or epidemiological criteria. Test performance is unknown in asymptomatic patients. This test is for in vitro diagnostic use under the FDA EUA for laboratories certified under CLIA to perform moderate and/or high complexity testing. This test has not been FDA cleared or approved. A negative result does not rule out the presence of PCR inhibitors in the specimen or target RNA in concentration below the limit of detection for the assay. If only one viral target is positive but coinfection with multiple targets is suspected, the sample should be re-tested with another FDA cleared, approved or authorized test, if coinfection would change clinical management. [facility] are certified under the Clinical Laboratory Improvement Amendments of 1988 (CLIA-88) as  qualified to perform moderate and/or high complexity laboratory testing.   EKG 12-lead, tracing only     Status: None (Preliminary result)   Result Value Ref Range    Systolic Blood Pressure  mmHg    Diastolic Blood Pressure  mmHg    Ventricular Rate 74 BPM    Atrial Rate 74 BPM    AK Interval 182 ms    QRS Duration 82 ms     ms    QTc 408 ms    P Axis 55 degrees    R AXIS 65 degrees    T Axis 49 degrees    Interpretation ECG Sinus rhythm  Normal ECG      hCG qual urine POCT     Status: Normal   Result Value Ref Range    HCG Qual Urine Negative  Negative    Internal QC Check POCT Valid Valid    POCT Kit Lot Number [id number]     POCT Kit Expiration Date 7410540    CBC with platelets differential     Status: Abnormal    Narrative    The following orders were created for panel order CBC with platelets differential.  Procedure                               Abnormality         Status                     ---------                               -----------         ------                     CBC with platelets and d...[[id number]]  Abnormal            Final result                 Please view results for these tests on the individual orders.     Medications   ibuprofen (ADVIL/MOTRIN) tablet 800 mg (800 mg Oral Given 6/30/22 1157)   iopamidol (ISOVUE-370) solution 100 mL (81 mLs Intravenous Given 6/30/22 1349)   sodium chloride 0.9 % bag 100mL (60 mLs Intravenous Given 6/30/22 1349)        Assessments & Plan (with Medical Decision Making)       I have reviewed the nursing notes.  Emergency Department course:  The patient was seen and examined at 1126 am.  I treated the patient with ibuprofen p.o.  EKG shows a normal sinus rhythm, rate of 74 bpm, with no acute ischemic changes.  Laboratory studies show an unremarkable CBC, with a WBC of 4.8.  Comprehensive metabolic panel is essentially normal apart from a low albumin.  Lipase is 235.  UA is nitrite and LCE negative.  D-dimer is 0.39; this is within normal range and makes pulmonary embolus unlikely.  Lactate is normal at 0.8.  COVID-19 is negative.  Influenza A and B are negative    On reassessment of the patient, she continued to complain of severe left upper quadrant abdominal pain.  She also stated that she was having cough.  At this point, I elected to obtain a CT of the chest abdomen and pelvis.  CT shows IMPRESSION:  1.  No acute abnormality in the chest, abdomen or pelvis.  She does have a small umbilical hernia on chest CT scanning.    Estrella Trent is a 36 year old female who presents with left upper quadrant  abdominal pain of unclear etiology.  Vital signs, EKG, laboratory studies, and radiographic studies are essentially unremarkable apart from a small umbilical hernia.  She has no evidence of acute coronary syndrome, pulmonary embolus or pneumonia.  The umbilical hernia is unlikely to be causing her pain.  I believe she is safe to be discharged home with close outpatient follow-up.  I recommend taking ibuprofen with food or Tylenol as needed for pain.  She can use heat or ice to the sore areas.  I have placed a primary care referral to facilitate follow-up.  I counseled the patient in my usual and standard fashion for abdominal pain  and reasons to return to the Emergency Department.     I have reviewed the findings, diagnosis, plan and need for follow up with the patient.    There are no discharge medications for this patient.      Final diagnoses:   LUQ abdominal pain     IJoel, am serving as a trained medical scribe to document services personally performed by Sujata Jackson MD, based on the provider's statements to me.     I, Sujata Jackson MD, was physically present and have reviewed and verified the accuracy of this note documented by Joel Mccoy.    --This note was created in part by the use of Dragon voice recognition dictation system. Inadvertent grammatical errors and typographical errors may still exist.  MD Sujata Santos MD  [facility] EMERGENCY DEPARTMENT  6/30/2022     Sujata Jackson MD  06/30/22 [id number]

## 2022-07-01 LAB
ATRIAL RATE - MUSE: 74 BPM
DIASTOLIC BLOOD PRESSURE - MUSE: NORMAL MMHG
INTERPRETATION ECG - MUSE: NORMAL
P AXIS - MUSE: 55 DEGREES
PR INTERVAL - MUSE: 182 MS
QRS DURATION - MUSE: 82 MS
QT - MUSE: 368 MS
QTC - MUSE: 408 MS
R AXIS - MUSE: 65 DEGREES
SYSTOLIC BLOOD PRESSURE - MUSE: NORMAL MMHG
T AXIS - MUSE: 49 DEGREES
VENTRICULAR RATE- MUSE: 74 BPM

## 2023-01-10 LAB
ABO (EXTERNAL): NORMAL
RH (EXTERNAL): POSITIVE

## 2023-03-17 LAB — HEMOGLOBIN (EXTERNAL): 7.7 G/DL (ref 0–0)

## 2023-05-07 ENCOUNTER — HOSPITAL ENCOUNTER (OUTPATIENT)
Facility: CLINIC | Age: 38
Discharge: HOME | End: 2023-05-07
Attending: OBSTETRICS & GYNECOLOGY | Admitting: OBSTETRICS & GYNECOLOGY
Payer: COMMERCIAL

## 2023-05-07 VITALS
BODY MASS INDEX: 25.62 KG/M2 | HEART RATE: 86 BPM | WEIGHT: 179 LBS | HEIGHT: 70 IN | SYSTOLIC BLOOD PRESSURE: 109 MMHG | DIASTOLIC BLOOD PRESSURE: 66 MMHG | TEMPERATURE: 98.6 F

## 2023-05-07 DIAGNOSIS — Z36.89 ENCOUNTER FOR TRIAGE IN PREGNANT PATIENT: Primary | ICD-10-CM

## 2023-05-07 LAB
ALBUMIN UR-MCNC: NEGATIVE MG/DL
APPEARANCE UR: ABNORMAL
BACTERIA #/AREA URNS HPF: ABNORMAL /HPF
BILIRUB UR QL STRIP: NEGATIVE
COLOR UR AUTO: ABNORMAL
GLUCOSE UR STRIP-MCNC: NEGATIVE MG/DL
HGB UR QL STRIP: NEGATIVE
KETONES UR STRIP-MCNC: NEGATIVE MG/DL
LEUKOCYTE ESTERASE UR QL STRIP: NEGATIVE
NITRATE UR QL: NEGATIVE
PH UR STRIP: 6.5 [PH] (ref 5–7)
RBC URINE: <1 /HPF
SP GR UR STRIP: 1 (ref 1–1.03)
SQUAMOUS EPITHELIAL: 9 /HPF
UROBILINOGEN UR STRIP-MCNC: NORMAL MG/DL
WBC URINE: 1 /HPF

## 2023-05-07 PROCEDURE — G0463 HOSPITAL OUTPT CLINIC VISIT: HCPCS

## 2023-05-07 PROCEDURE — 81001 URINALYSIS AUTO W/SCOPE: CPT

## 2023-05-07 PROCEDURE — 999N000104 HC STATISTIC NO CHARGE: Performed by: EMERGENCY MEDICINE

## 2023-05-07 PROCEDURE — 250N000013 HC RX MED GY IP 250 OP 250 PS 637

## 2023-05-07 RX ORDER — CYCLOBENZAPRINE HCL 10 MG
10 TABLET ORAL 3 TIMES DAILY PRN
Qty: 9 TABLET | Refills: 0 | Status: SHIPPED | OUTPATIENT
Start: 2023-05-07 | End: 2023-05-07

## 2023-05-07 RX ORDER — CYCLOBENZAPRINE HCL 5 MG
10 TABLET ORAL ONCE
Status: COMPLETED | OUTPATIENT
Start: 2023-05-07 | End: 2023-05-07

## 2023-05-07 RX ORDER — ACETAMINOPHEN 500 MG
500-1000 TABLET ORAL EVERY 6 HOURS PRN
Status: ON HOLD | COMMUNITY
End: 2023-07-13

## 2023-05-07 RX ORDER — LIDOCAINE 40 MG/G
CREAM TOPICAL
Status: DISCONTINUED | OUTPATIENT
Start: 2023-05-07 | End: 2023-05-08 | Stop reason: HOSPADM

## 2023-05-07 RX ORDER — CYCLOBENZAPRINE HCL 10 MG
10 TABLET ORAL 3 TIMES DAILY PRN
Qty: 9 TABLET | Refills: 0 | Status: ON HOLD | OUTPATIENT
Start: 2023-05-07 | End: 2023-07-13

## 2023-05-07 RX ADMIN — CYCLOBENZAPRINE HYDROCHLORIDE 10 MG: 5 TABLET, FILM COATED ORAL at 22:46

## 2023-05-07 ASSESSMENT — ACTIVITIES OF DAILY LIVING (ADL)
ADLS_ACUITY_SCORE: 31
ADLS_ACUITY_SCORE: 31

## 2023-05-08 NOTE — PLAN OF CARE
Data: [facility] at 1948.   Reason for maternal/fetal assessment per patient is Contractions (Complaints of pelvic/vaginal pain and difficulty walking)  . Patient is a . Prenatal record reviewed. Patient here for pelvic pan rated an 8 making it difficult to walk.     OB History    Para Term  AB Living   2 1 1 0 0 2   SAB IAB Ectopic Multiple Live Births   0 0 0 1 2      # Outcome Date GA Lbr Kenny/2nd Weight Sex Delivery Anes PTL Lv   2 Current            1 Term               Medical History: No past medical history on file.. Gestational Age 31w0d. VSS. Cervix: not examined.  Fetal movement present. Patient denies cramping, backache, vaginal discharge, pelvic pressure, UTI symptoms, GI problems, bloody show, vaginal bleeding, edema, visual disturbances, epigastric or URQ pain, abdominal pain, rupture of membranes.   Action: Verbal consent for EFM. Triage assessment completed. EFM applied for assessment. Uterine assessment TOCO applied. Fetal assessment: Presumed adequate fetal oxygenation documented (see flow record). . Patient instructed to report change in fetal movement, vaginal leaking of fluid or bleeding, abdominal pain, or any concerns related to the pregnancy to her nurse/physician.   Response: Dr. Borja informed of arrival and history. Plan per provider is to try and use abdominal binder, warm packs and flexeril to treat pain. Follow up at next appointment about PT if pain persists Patient verbalized understanding of education and verbalized agreement with plan. Discharged ambulatory at 2250.

## 2023-05-08 NOTE — ED NOTES
Pt arrived to ED with complaint of pelvic pain .  Pt reports 32 weeks pregnant.   Pt is having contractions No.   Pt feels urge to push No.   Pt reports water broke No.   Report was called and pt was transferred to L&D Yes.

## 2023-05-08 NOTE — PROGRESS NOTES
L&D Triage Progress Note     HPI: Estrella Trent is a 37 year old  at 31w0d by 13w US, here for evaluation of pelvic pain.     Pregnancy notable for:  - Hx LTCS x1 (di-di twins)   - PARKER/PICA, s/p Fe infusion x2    Patient reports that today at 5 pm she began experiencing a deep pelvic discomfort/pain. She notes the pain is worse with movement/walking and much improved/resolved with lying still. Is able to walk without issue though uncomfortable. She denies any contractions, abdominal pain, vaginal bleeding, vaginal discharge, loss of fluid, urinary symptoms, fevers, chills. She reports use of Tylenol at home without resolve. She has not experienced this type of pain thus far in pregnancy. No abnormal activity or abdominal trauma. Of note, patient receives prenatal care at [facility] and has visit scheduled for .       OBHX:  OB History    Para Term  AB Living   2 1 1 0 0 2   SAB IAB Ectopic Multiple Live Births   0 0 0 1 2      # Outcome Date GA Lbr Kenny/2nd Weight Sex Delivery Anes PTL Lv   2 Current            1 Term                MedicalHX:  No past medical history on file.    SurgicalHX:  Past Surgical History:   Procedure Laterality Date      SECTION         Medications:  No current facility-administered medications on file prior to encounter.  acetaminophen (TYLENOL) 500 MG tablet, Take 500-1,000 mg by mouth every 6 hours as needed for mild pain  Prenatal Vit-Fe Fumarate-FA (PRENATAL MULTIVITAMIN  PLUS IRON) 27-1 MG TABS, Take 1 tablet by mouth daily      Allergies:  No Known Allergies    FamilyHX:    No family history on file.    SocialHX:  Social History     Socioeconomic History     Marital status: Single   Tobacco Use     Smoking status: Never     Smokeless tobacco: Never   Substance and Sexual Activity     Alcohol use: Not Currently     Drug use: Not Currently     Sexual activity: Not Currently       ROS: 10-point ROS negative except as in HPI.    Physical  "Exam:  Vitals:    23   BP: 109/66   BP Location: Right arm   Patient Position: Semi-Ibrahim's   Cuff Size: Adult Regular   Pulse: 86   Temp: 98.6  F (37  C)   TempSrc: Oral   Weight: 81.2 kg (179 lb)   Height: 1.79 m (5' 10.47\")     GEN: resting comfortably in bed, NAD   CV: Regular rate, well perfused  PULM: On room air, no increased work of breathing  ABD: soft, gravid, non-tender, non-distended  EXT: No edema, non- tender to palpation    CVX: C/L/H  Presentation: cephalic by BSUS    NST:  FHT: baseline 120, moderate variability, + accels, - decels  TOCO: not virgen     A/P:  Estrella Trent is a 37 year old  at 31w0d by 13w , here for evaluation of pelvic pain. Patient's pain most consistent with musculoskeletal/ pubic symphysis pain. Describes pain that is exacerbated with movement and much improved after application of hot pack in triage. Offered trial of Flexeril though patient drove herself to triage and declined given pain manageable. Pain not consistent with  labor- cervix closed, not virgen on the monitor, and denies any contractions. Did obtain urinalysis which was unremarkable. Fetal heart tracing reactive and reassuring. Okay to discharge home and patient comfortable with this plan. Discussed nature of pain and suspicion for pubic symphysis related discomfort. Discussed recommendation for continued use of hot packs and given short course of Flexeril. Discussed recommendation to follow up as scheduled for visit with primary OB on  and the importance of attending this visit as patient missed last prenatal visit. Recommended use of a belly band for support and was shown examples online. Also recommended pelvic floor physical therapy if the discomfort/pain persists. Patient will follow up with primary OB for this referral as needed. Given strict return precautions including abrupt increase in pain, contractions, vaginal bleeding, loss of fluid, decreased fetal movement " .    Patient discussed with Dr. Teague.     Elif Moctezuma MD  Obstetrics and Gynecology   PGY-2  05/07/23 11:05 PM     I discussed Estrella Trent on 5/7/2023 with Dr. Moctezuma and agree with the presentation, exam and plan of care documented in this note with edits by me.   Angelika Teague MD MPH

## 2023-05-08 NOTE — DISCHARGE INSTRUCTIONS
Discharge Instructions for  Undelivered Patients    You were seen for:  Pelvic Pain  We Consulted: Dr. Teague  You had (Test or Medicine):Fetal  Heart monitoring, cervical check, and  urinalysis and assessment.    Diet:  * Drink 8 to 12 glasses of liquids (milk, juice, water) every day.  You may eat meals and snacks.    Activity:  * Call your doctor or nurse midwife if your baby is moving less than usual.  * Count fetal kicks every day (see handout).  * Call your doctor or nurse midwife if your baby is moving less than usual.    Call your provider if you notice:  * Swelling in your face or increased swelling in your hands or legs.  * Headaches that are not relieved by Tylenol (acetaminophen).  * Changes in your vision (blurring; seeing spots or stars).  * Nausea (sick to your stomach) and vomiting (throwing up).  * Weight gain of 5 pounds per week.  * Heartburn that doesn't go away.  * Signs of bladder infection: Pain when you urinate (use the toilet), needing to go more often or more urgently.  * The bag of ye (membrane) breaks, or you notice leaking in your underwear.  * Bright red blood in your underwear.  * Abdominal (lower belly) or stomach pain.  * Contractions (tightenings) more than 6 times in one hour.  * Increase or change in vaginal discharge (note the color and amount).  Call with questions or concerns. Follow up with your primary provider on you next appointment.

## 2023-07-11 ENCOUNTER — HOSPITAL ENCOUNTER (INPATIENT)
Facility: CLINIC | Age: 38
LOS: 1 days | Discharge: HOME HEALTH | DRG: 776 | End: 2023-07-13
Attending: EMERGENCY MEDICINE | Admitting: OBSTETRICS & GYNECOLOGY
Payer: COMMERCIAL

## 2023-07-11 DIAGNOSIS — Z98.891 S/P REPEAT LOW TRANSVERSE C-SECTION: ICD-10-CM

## 2023-07-11 LAB
ALBUMIN SERPL BCG-MCNC: 3.3 G/DL (ref 3.5–5.2)
ALBUMIN UR-MCNC: NEGATIVE MG/DL
ALP SERPL-CCNC: 92 U/L (ref 35–104)
ALT SERPL W P-5'-P-CCNC: 52 U/L (ref 0–50)
ANION GAP SERPL CALCULATED.3IONS-SCNC: 10 MMOL/L (ref 7–15)
APPEARANCE UR: CLEAR
AST SERPL W P-5'-P-CCNC: 50 U/L (ref 0–45)
BACTERIA #/AREA URNS HPF: ABNORMAL /HPF
BILIRUB SERPL-MCNC: 0.2 MG/DL
BILIRUB UR QL STRIP: NEGATIVE
BUN SERPL-MCNC: 8.9 MG/DL (ref 6–20)
CALCIUM SERPL-MCNC: 8.8 MG/DL (ref 8.6–10)
CHLORIDE SERPL-SCNC: 106 MMOL/L (ref 98–107)
COLOR UR AUTO: ABNORMAL
CREAT SERPL-MCNC: 0.49 MG/DL (ref 0.51–0.95)
DEPRECATED HCO3 PLAS-SCNC: 22 MMOL/L (ref 22–29)
GFR SERPL CREATININE-BSD FRML MDRD: >90 ML/MIN/1.73M2
GLUCOSE SERPL-MCNC: 92 MG/DL (ref 70–99)
GLUCOSE UR STRIP-MCNC: NEGATIVE MG/DL
HGB UR QL STRIP: ABNORMAL
KETONES UR STRIP-MCNC: NEGATIVE MG/DL
LEUKOCYTE ESTERASE UR QL STRIP: ABNORMAL
MAGNESIUM SERPL-MCNC: 1.7 MG/DL (ref 1.7–2.3)
NITRATE UR QL: NEGATIVE
PH UR STRIP: 7 [PH] (ref 5–7)
POTASSIUM SERPL-SCNC: 4 MMOL/L (ref 3.4–5.3)
PROT SERPL-MCNC: 6.1 G/DL (ref 6.4–8.3)
RBC URINE: <1 /HPF
SODIUM SERPL-SCNC: 138 MMOL/L (ref 136–145)
SP GR UR STRIP: 1.01 (ref 1–1.03)
SQUAMOUS EPITHELIAL: <1 /HPF
UROBILINOGEN UR STRIP-MCNC: NORMAL MG/DL
WBC URINE: 4 /HPF

## 2023-07-11 PROCEDURE — 96375 TX/PRO/DX INJ NEW DRUG ADDON: CPT | Performed by: EMERGENCY MEDICINE

## 2023-07-11 PROCEDURE — 250N000011 HC RX IP 250 OP 636: Mod: JZ | Performed by: EMERGENCY MEDICINE

## 2023-07-11 PROCEDURE — 81001 URINALYSIS AUTO W/SCOPE: CPT | Performed by: EMERGENCY MEDICINE

## 2023-07-11 PROCEDURE — 99285 EMERGENCY DEPT VISIT HI MDM: CPT | Performed by: EMERGENCY MEDICINE

## 2023-07-11 PROCEDURE — 99285 EMERGENCY DEPT VISIT HI MDM: CPT | Mod: 25 | Performed by: EMERGENCY MEDICINE

## 2023-07-11 PROCEDURE — 36415 COLL VENOUS BLD VENIPUNCTURE: CPT | Performed by: EMERGENCY MEDICINE

## 2023-07-11 PROCEDURE — 80053 COMPREHEN METABOLIC PANEL: CPT | Performed by: EMERGENCY MEDICINE

## 2023-07-11 PROCEDURE — 258N000003 HC RX IP 258 OP 636: Performed by: EMERGENCY MEDICINE

## 2023-07-11 PROCEDURE — 96374 THER/PROPH/DIAG INJ IV PUSH: CPT | Performed by: EMERGENCY MEDICINE

## 2023-07-11 PROCEDURE — 96361 HYDRATE IV INFUSION ADD-ON: CPT | Performed by: EMERGENCY MEDICINE

## 2023-07-11 PROCEDURE — 83735 ASSAY OF MAGNESIUM: CPT | Performed by: EMERGENCY MEDICINE

## 2023-07-11 RX ORDER — LABETALOL HYDROCHLORIDE 5 MG/ML
20-80 INJECTION, SOLUTION INTRAVENOUS EVERY 10 MIN PRN
Status: DISCONTINUED | OUTPATIENT
Start: 2023-07-11 | End: 2023-07-13 | Stop reason: HOSPADM

## 2023-07-11 RX ORDER — DIPHENHYDRAMINE HYDROCHLORIDE 50 MG/ML
25 INJECTION INTRAMUSCULAR; INTRAVENOUS ONCE
Status: COMPLETED | OUTPATIENT
Start: 2023-07-11 | End: 2023-07-11

## 2023-07-11 RX ADMIN — SODIUM CHLORIDE 1000 ML: 9 INJECTION, SOLUTION INTRAVENOUS at 23:09

## 2023-07-11 RX ADMIN — PROCHLORPERAZINE EDISYLATE 10 MG: 5 INJECTION INTRAMUSCULAR; INTRAVENOUS at 23:24

## 2023-07-11 RX ADMIN — DIPHENHYDRAMINE HYDROCHLORIDE 25 MG: 50 INJECTION, SOLUTION INTRAMUSCULAR; INTRAVENOUS at 23:23

## 2023-07-11 ASSESSMENT — ACTIVITIES OF DAILY LIVING (ADL): ADLS_ACUITY_SCORE: 35

## 2023-07-12 ENCOUNTER — APPOINTMENT (OUTPATIENT)
Dept: ULTRASOUND IMAGING | Facility: CLINIC | Age: 38
DRG: 776 | End: 2023-07-12
Attending: EMERGENCY MEDICINE
Payer: COMMERCIAL

## 2023-07-12 PROBLEM — O14.90 PRE-ECLAMPSIA: Status: ACTIVE | Noted: 2023-07-12

## 2023-07-12 LAB
ALT SERPL W P-5'-P-CCNC: 46 U/L (ref 0–50)
AST SERPL W P-5'-P-CCNC: 42 U/L (ref 0–45)
BASOPHILS # BLD AUTO: 0 10E3/UL (ref 0–0.2)
BASOPHILS NFR BLD AUTO: 1 %
CREAT SERPL-MCNC: 0.46 MG/DL (ref 0.51–0.95)
EOSINOPHIL # BLD AUTO: 0.1 10E3/UL (ref 0–0.7)
EOSINOPHIL NFR BLD AUTO: 2 %
ERYTHROCYTE [DISTWIDTH] IN BLOOD BY AUTOMATED COUNT: 24 % (ref 10–15)
ERYTHROCYTE [DISTWIDTH] IN BLOOD BY AUTOMATED COUNT: 24.3 % (ref 10–15)
GFR SERPL CREATININE-BSD FRML MDRD: >90 ML/MIN/1.73M2
HCT VFR BLD AUTO: 31.8 % (ref 35–47)
HCT VFR BLD AUTO: 33.5 % (ref 35–47)
HGB BLD-MCNC: 10 G/DL (ref 11.7–15.7)
HGB BLD-MCNC: 10.5 G/DL (ref 11.7–15.7)
IMM GRANULOCYTES # BLD: 0.1 10E3/UL
IMM GRANULOCYTES NFR BLD: 2 %
LYMPHOCYTES # BLD AUTO: 1.3 10E3/UL (ref 0.8–5.3)
LYMPHOCYTES NFR BLD AUTO: 22 %
MAGNESIUM SERPL-MCNC: 3.1 MG/DL (ref 1.7–2.3)
MCH RBC QN AUTO: 26.4 PG (ref 26.5–33)
MCH RBC QN AUTO: 26.7 PG (ref 26.5–33)
MCHC RBC AUTO-ENTMCNC: 31.3 G/DL (ref 31.5–36.5)
MCHC RBC AUTO-ENTMCNC: 31.4 G/DL (ref 31.5–36.5)
MCV RBC AUTO: 84 FL (ref 78–100)
MCV RBC AUTO: 85 FL (ref 78–100)
MONOCYTES # BLD AUTO: 0.5 10E3/UL (ref 0–1.3)
MONOCYTES NFR BLD AUTO: 8 %
NEUTROPHILS # BLD AUTO: 3.8 10E3/UL (ref 1.6–8.3)
NEUTROPHILS NFR BLD AUTO: 65 %
NRBC # BLD AUTO: 0 10E3/UL
NRBC BLD AUTO-RTO: 0 /100
PLATELET # BLD AUTO: 192 10E3/UL (ref 150–450)
PLATELET # BLD AUTO: 197 10E3/UL (ref 150–450)
RBC # BLD AUTO: 3.74 10E6/UL (ref 3.8–5.2)
RBC # BLD AUTO: 3.97 10E6/UL (ref 3.8–5.2)
WBC # BLD AUTO: 5.3 10E3/UL (ref 4–11)
WBC # BLD AUTO: 5.8 10E3/UL (ref 4–11)

## 2023-07-12 PROCEDURE — 258N000003 HC RX IP 258 OP 636

## 2023-07-12 PROCEDURE — 250N000013 HC RX MED GY IP 250 OP 250 PS 637

## 2023-07-12 PROCEDURE — 84450 TRANSFERASE (AST) (SGOT): CPT

## 2023-07-12 PROCEDURE — 83735 ASSAY OF MAGNESIUM: CPT | Performed by: OBSTETRICS & GYNECOLOGY

## 2023-07-12 PROCEDURE — 99222 1ST HOSP IP/OBS MODERATE 55: CPT | Mod: GC | Performed by: OBSTETRICS & GYNECOLOGY

## 2023-07-12 PROCEDURE — 250N000011 HC RX IP 250 OP 636: Mod: JZ

## 2023-07-12 PROCEDURE — 85027 COMPLETE CBC AUTOMATED: CPT

## 2023-07-12 PROCEDURE — 36415 COLL VENOUS BLD VENIPUNCTURE: CPT | Performed by: EMERGENCY MEDICINE

## 2023-07-12 PROCEDURE — 250N000013 HC RX MED GY IP 250 OP 250 PS 637: Performed by: OBSTETRICS & GYNECOLOGY

## 2023-07-12 PROCEDURE — 82565 ASSAY OF CREATININE: CPT

## 2023-07-12 PROCEDURE — 120N000002 HC R&B MED SURG/OB UMMC

## 2023-07-12 PROCEDURE — 85025 COMPLETE CBC W/AUTO DIFF WBC: CPT | Performed by: EMERGENCY MEDICINE

## 2023-07-12 PROCEDURE — 84460 ALANINE AMINO (ALT) (SGPT): CPT

## 2023-07-12 PROCEDURE — 93971 EXTREMITY STUDY: CPT | Mod: RT

## 2023-07-12 PROCEDURE — 36415 COLL VENOUS BLD VENIPUNCTURE: CPT | Performed by: OBSTETRICS & GYNECOLOGY

## 2023-07-12 PROCEDURE — 36415 COLL VENOUS BLD VENIPUNCTURE: CPT

## 2023-07-12 RX ORDER — MAGNESIUM SULFATE HEPTAHYDRATE 40 MG/ML
4 INJECTION, SOLUTION INTRAVENOUS ONCE
Status: COMPLETED | OUTPATIENT
Start: 2023-07-12 | End: 2023-07-12

## 2023-07-12 RX ORDER — SODIUM CHLORIDE, SODIUM LACTATE, POTASSIUM CHLORIDE, CALCIUM CHLORIDE 600; 310; 30; 20 MG/100ML; MG/100ML; MG/100ML; MG/100ML
INJECTION, SOLUTION INTRAVENOUS
Status: DISPENSED
Start: 2023-07-12 | End: 2023-07-13

## 2023-07-12 RX ORDER — CALCIUM GLUCONATE 94 MG/ML
1 INJECTION, SOLUTION INTRAVENOUS
Status: DISCONTINUED | OUTPATIENT
Start: 2023-07-12 | End: 2023-07-13 | Stop reason: HOSPADM

## 2023-07-12 RX ORDER — LABETALOL HYDROCHLORIDE 5 MG/ML
20-80 INJECTION, SOLUTION INTRAVENOUS EVERY 10 MIN PRN
Status: DISCONTINUED | OUTPATIENT
Start: 2023-07-12 | End: 2023-07-13 | Stop reason: HOSPADM

## 2023-07-12 RX ORDER — AMOXICILLIN 250 MG
1 CAPSULE ORAL 2 TIMES DAILY
Status: DISCONTINUED | OUTPATIENT
Start: 2023-07-12 | End: 2023-07-13 | Stop reason: HOSPADM

## 2023-07-12 RX ORDER — NIFEDIPINE 30 MG/1
30 TABLET, EXTENDED RELEASE ORAL EVERY 24 HOURS
Status: DISCONTINUED | OUTPATIENT
Start: 2023-07-12 | End: 2023-07-12

## 2023-07-12 RX ORDER — SIMETHICONE 80 MG
80 TABLET,CHEWABLE ORAL 4 TIMES DAILY PRN
Status: DISCONTINUED | OUTPATIENT
Start: 2023-07-12 | End: 2023-07-13 | Stop reason: HOSPADM

## 2023-07-12 RX ORDER — LIDOCAINE 40 MG/G
CREAM TOPICAL
Status: DISCONTINUED | OUTPATIENT
Start: 2023-07-12 | End: 2023-07-13 | Stop reason: HOSPADM

## 2023-07-12 RX ORDER — MAGNESIUM SULFATE HEPTAHYDRATE 40 MG/ML
2 INJECTION, SOLUTION INTRAVENOUS
Status: DISCONTINUED | OUTPATIENT
Start: 2023-07-12 | End: 2023-07-13 | Stop reason: HOSPADM

## 2023-07-12 RX ORDER — MAGNESIUM SULFATE IN WATER 40 MG/ML
2 INJECTION, SOLUTION INTRAVENOUS CONTINUOUS
Status: DISCONTINUED | OUTPATIENT
Start: 2023-07-12 | End: 2023-07-12

## 2023-07-12 RX ORDER — MAGNESIUM SULFATE HEPTAHYDRATE 40 MG/ML
4 INJECTION, SOLUTION INTRAVENOUS
Status: DISCONTINUED | OUTPATIENT
Start: 2023-07-12 | End: 2023-07-13 | Stop reason: HOSPADM

## 2023-07-12 RX ORDER — IBUPROFEN 800 MG/1
800 TABLET, FILM COATED ORAL EVERY 6 HOURS PRN
Status: DISCONTINUED | OUTPATIENT
Start: 2023-07-12 | End: 2023-07-13 | Stop reason: HOSPADM

## 2023-07-12 RX ORDER — ONDANSETRON 2 MG/ML
4 INJECTION INTRAMUSCULAR; INTRAVENOUS EVERY 6 HOURS PRN
Status: DISCONTINUED | OUTPATIENT
Start: 2023-07-12 | End: 2023-07-13 | Stop reason: HOSPADM

## 2023-07-12 RX ORDER — AMOXICILLIN 250 MG
2 CAPSULE ORAL 2 TIMES DAILY
Status: DISCONTINUED | OUTPATIENT
Start: 2023-07-12 | End: 2023-07-13 | Stop reason: HOSPADM

## 2023-07-12 RX ORDER — CARBOPROST TROMETHAMINE 250 UG/ML
250 INJECTION, SOLUTION INTRAMUSCULAR
Status: DISCONTINUED | OUTPATIENT
Start: 2023-07-12 | End: 2023-07-13 | Stop reason: HOSPADM

## 2023-07-12 RX ORDER — MISOPROSTOL 200 UG/1
400 TABLET ORAL
Status: DISCONTINUED | OUTPATIENT
Start: 2023-07-12 | End: 2023-07-13 | Stop reason: HOSPADM

## 2023-07-12 RX ORDER — OXYTOCIN/0.9 % SODIUM CHLORIDE 30/500 ML
340 PLASTIC BAG, INJECTION (ML) INTRAVENOUS CONTINUOUS PRN
Status: DISCONTINUED | OUTPATIENT
Start: 2023-07-12 | End: 2023-07-13 | Stop reason: HOSPADM

## 2023-07-12 RX ORDER — ACETAMINOPHEN 325 MG/1
975 TABLET ORAL EVERY 6 HOURS PRN
Status: DISCONTINUED | OUTPATIENT
Start: 2023-07-12 | End: 2023-07-13 | Stop reason: HOSPADM

## 2023-07-12 RX ORDER — PROCHLORPERAZINE 25 MG
25 SUPPOSITORY, RECTAL RECTAL EVERY 12 HOURS PRN
Status: DISCONTINUED | OUTPATIENT
Start: 2023-07-12 | End: 2023-07-13 | Stop reason: HOSPADM

## 2023-07-12 RX ORDER — BISACODYL 10 MG
10 SUPPOSITORY, RECTAL RECTAL DAILY PRN
Status: DISCONTINUED | OUTPATIENT
Start: 2023-07-14 | End: 2023-07-13 | Stop reason: HOSPADM

## 2023-07-12 RX ORDER — MISOPROSTOL 200 UG/1
800 TABLET ORAL
Status: DISCONTINUED | OUTPATIENT
Start: 2023-07-12 | End: 2023-07-13 | Stop reason: HOSPADM

## 2023-07-12 RX ORDER — HYDRALAZINE HYDROCHLORIDE 20 MG/ML
10 INJECTION INTRAMUSCULAR; INTRAVENOUS
Status: DISCONTINUED | OUTPATIENT
Start: 2023-07-12 | End: 2023-07-13 | Stop reason: HOSPADM

## 2023-07-12 RX ORDER — ONDANSETRON 4 MG/1
4 TABLET, ORALLY DISINTEGRATING ORAL EVERY 6 HOURS PRN
Status: DISCONTINUED | OUTPATIENT
Start: 2023-07-12 | End: 2023-07-13 | Stop reason: HOSPADM

## 2023-07-12 RX ORDER — OXYTOCIN 10 [USP'U]/ML
10 INJECTION, SOLUTION INTRAMUSCULAR; INTRAVENOUS
Status: DISCONTINUED | OUTPATIENT
Start: 2023-07-12 | End: 2023-07-13 | Stop reason: HOSPADM

## 2023-07-12 RX ORDER — HYDROCORTISONE 25 MG/G
CREAM TOPICAL 3 TIMES DAILY PRN
Status: DISCONTINUED | OUTPATIENT
Start: 2023-07-12 | End: 2023-07-13 | Stop reason: HOSPADM

## 2023-07-12 RX ORDER — MODIFIED LANOLIN
OINTMENT (GRAM) TOPICAL
Status: DISCONTINUED | OUTPATIENT
Start: 2023-07-12 | End: 2023-07-13 | Stop reason: HOSPADM

## 2023-07-12 RX ORDER — METOCLOPRAMIDE 10 MG/1
10 TABLET ORAL EVERY 6 HOURS PRN
Status: DISCONTINUED | OUTPATIENT
Start: 2023-07-12 | End: 2023-07-13 | Stop reason: HOSPADM

## 2023-07-12 RX ORDER — PROCHLORPERAZINE MALEATE 10 MG
10 TABLET ORAL EVERY 6 HOURS PRN
Status: DISCONTINUED | OUTPATIENT
Start: 2023-07-12 | End: 2023-07-13 | Stop reason: HOSPADM

## 2023-07-12 RX ORDER — MAGNESIUM SULFATE IN WATER 40 MG/ML
2 INJECTION, SOLUTION INTRAVENOUS CONTINUOUS
Status: ACTIVE | OUTPATIENT
Start: 2023-07-12 | End: 2023-07-13

## 2023-07-12 RX ORDER — SODIUM CHLORIDE, SODIUM LACTATE, POTASSIUM CHLORIDE, CALCIUM CHLORIDE 600; 310; 30; 20 MG/100ML; MG/100ML; MG/100ML; MG/100ML
INJECTION, SOLUTION INTRAVENOUS
Status: DISPENSED
Start: 2023-07-12 | End: 2023-07-12

## 2023-07-12 RX ORDER — METOCLOPRAMIDE HYDROCHLORIDE 5 MG/ML
10 INJECTION INTRAMUSCULAR; INTRAVENOUS EVERY 6 HOURS PRN
Status: DISCONTINUED | OUTPATIENT
Start: 2023-07-12 | End: 2023-07-13 | Stop reason: HOSPADM

## 2023-07-12 RX ORDER — SODIUM CHLORIDE, SODIUM LACTATE, POTASSIUM CHLORIDE, CALCIUM CHLORIDE 600; 310; 30; 20 MG/100ML; MG/100ML; MG/100ML; MG/100ML
10-125 INJECTION, SOLUTION INTRAVENOUS CONTINUOUS
Status: DISCONTINUED | OUTPATIENT
Start: 2023-07-12 | End: 2023-07-13 | Stop reason: HOSPADM

## 2023-07-12 RX ORDER — OXYCODONE HYDROCHLORIDE 5 MG/1
5 TABLET ORAL EVERY 4 HOURS PRN
Status: DISCONTINUED | OUTPATIENT
Start: 2023-07-12 | End: 2023-07-13 | Stop reason: HOSPADM

## 2023-07-12 RX ORDER — TRANEXAMIC ACID 10 MG/ML
1 INJECTION, SOLUTION INTRAVENOUS EVERY 30 MIN PRN
Status: DISCONTINUED | OUTPATIENT
Start: 2023-07-12 | End: 2023-07-13 | Stop reason: HOSPADM

## 2023-07-12 RX ADMIN — Medication 1 LOZENGE: at 10:27

## 2023-07-12 RX ADMIN — IBUPROFEN 800 MG: 800 TABLET ORAL at 16:04

## 2023-07-12 RX ADMIN — OXYCODONE HYDROCHLORIDE 5 MG: 5 TABLET ORAL at 21:05

## 2023-07-12 RX ADMIN — ONDANSETRON 4 MG: 2 INJECTION INTRAMUSCULAR; INTRAVENOUS at 01:17

## 2023-07-12 RX ADMIN — OXYCODONE HYDROCHLORIDE 5 MG: 5 TABLET ORAL at 06:25

## 2023-07-12 RX ADMIN — MAGNESIUM SULFATE HEPTAHYDRATE 2 G/HR: 40 INJECTION, SOLUTION INTRAVENOUS at 03:29

## 2023-07-12 RX ADMIN — SODIUM CHLORIDE, POTASSIUM CHLORIDE, SODIUM LACTATE AND CALCIUM CHLORIDE 75 ML/HR: 600; 310; 30; 20 INJECTION, SOLUTION INTRAVENOUS at 16:05

## 2023-07-12 RX ADMIN — MAGNESIUM SULFATE HEPTAHYDRATE 4 G: 40 INJECTION, SOLUTION INTRAVENOUS at 02:46

## 2023-07-12 RX ADMIN — ACETAMINOPHEN 975 MG: 325 TABLET, FILM COATED ORAL at 11:26

## 2023-07-12 RX ADMIN — SENNOSIDES AND DOCUSATE SODIUM 1 TABLET: 50; 8.6 TABLET ORAL at 20:06

## 2023-07-12 RX ADMIN — Medication 1 LOZENGE: at 16:35

## 2023-07-12 RX ADMIN — MAGNESIUM SULFATE HEPTAHYDRATE 4 G: 40 INJECTION, SOLUTION INTRAVENOUS at 17:42

## 2023-07-12 RX ADMIN — SODIUM CHLORIDE, POTASSIUM CHLORIDE, SODIUM LACTATE AND CALCIUM CHLORIDE 10 ML/HR: 600; 310; 30; 20 INJECTION, SOLUTION INTRAVENOUS at 02:40

## 2023-07-12 RX ADMIN — SENNOSIDES AND DOCUSATE SODIUM 1 TABLET: 50; 8.6 TABLET ORAL at 07:56

## 2023-07-12 ASSESSMENT — ACTIVITIES OF DAILY LIVING (ADL)
ADLS_ACUITY_SCORE: 31
ADLS_ACUITY_SCORE: 35
DRESSING/BATHING_DIFFICULTY: NO
ADLS_ACUITY_SCORE: 20
ADLS_ACUITY_SCORE: 20
DOING_ERRANDS_INDEPENDENTLY_DIFFICULTY: NO
ADLS_ACUITY_SCORE: 20
TOILETING_ISSUES: NO
CHANGE_IN_FUNCTIONAL_STATUS_SINCE_ONSET_OF_CURRENT_ILLNESS/INJURY: NO
WEAR_GLASSES_OR_BLIND: YES
DIFFICULTY_EATING/SWALLOWING: NO
CONCENTRATING,_REMEMBERING_OR_MAKING_DECISIONS_DIFFICULTY: NO
ADLS_ACUITY_SCORE: 20
ADLS_ACUITY_SCORE: 20
ADLS_ACUITY_SCORE: 35
ADLS_ACUITY_SCORE: 31
ADLS_ACUITY_SCORE: 35
FALL_HISTORY_WITHIN_LAST_SIX_MONTHS: NO
ADLS_ACUITY_SCORE: 35
WALKING_OR_CLIMBING_STAIRS_DIFFICULTY: NO
ADLS_ACUITY_SCORE: 35

## 2023-07-12 NOTE — PROVIDER NOTIFICATION
07/12/23 1529   Provider Notification   Provider Name/Title Dr. Richardson   Method of Notification Electronic Page   Request Evaluate in Person   Notification Reason Status Update;Other   there is a suspicion that mg was not infusing due to the pump error. can you call RN and discuss ? Thanks    1530. Discussed with Dr. Richardson that the mg pump shows that pt received a total of 417 ml of Mg but the bag looks still full. Changed the pump and restarted the magnesium with the new pump and rate verified with charge nurse cosign. Dr. Richardson ordered Stat Mg level. Lab result will be communicated with Dr. Richardson as soon as it is resulted.

## 2023-07-12 NOTE — PROVIDER NOTIFICATION
07/12/23 0420   Provider Notification   Provider Name/Title Dr Montiel   Method of Notification Electronic Page   Request Evaluate-Remote   Notification Reason Medication Request     Pt has dry cough off and on, keeping her awake. Reports it started this morning. Wondering if you can add order for throat lozenge? Thx.

## 2023-07-12 NOTE — DISCHARGE SUMMARY
[facility] Discharge Summary    Estrella Trent MRN# [id number]   Age: 37 year old YOB: 1985     Date of Admission:  7/11/2023  Date of Discharge::  7/13/2023  Admitting Physician:  Sirena Armas MD  Discharge Physician:  Bryanna Murray MD          Admission Diagnoses:   -POD#6 RLTCS   -Anemia  -Preeclampsia with severe features (HA)   -Right lower extremity pain          Discharge Diagnosis:   -Same          Procedures:   Procedure(s): Magnesium sulfate infusion  Right lower extremity ultrasound            Medications Prior to Admission:     No medications prior to admission.             Discharge Medications:        Review of your medicines      START taking      Dose / Directions   ibuprofen 600 MG tablet  Commonly known as: ADVIL/MOTRIN  Used for: Preeclampsia in postpartum period      Dose: 600 mg  Take 1 tablet (600 mg) by mouth every 6 hours as needed for moderate pain Start after delivery  Quantity: 60 tablet  Refills: 0     senna-docusate 8.6-50 MG tablet  Commonly known as: SENOKOT-S/PERICOLACE  Used for: Preeclampsia in postpartum period      Dose: 1 tablet  Take 1 tablet by mouth daily Start after delivery.  Quantity: 100 tablet  Refills: 0        CONTINUE these medicines which may have CHANGED, or have new prescriptions. If we are uncertain of the size of tablets/capsules you have at home, strength may be listed as something that might have changed.      Dose / Directions   acetaminophen 325 MG tablet  Commonly known as: TYLENOL  This may have changed:     medication strength    how much to take    additional instructions  Used for: Preeclampsia in postpartum period      Dose: 650 mg  Take 2 tablets (650 mg) by mouth every 6 hours as needed for mild pain Start after Delivery.  Quantity: 100 tablet  Refills: 0        CONTINUE these medicines which have NOT CHANGED      Dose / Directions   prenatal multivitamin  plus iron 27-1 MG Tabs      Dose: 1 tablet  Take 1 tablet by  mouth daily  Refills: 0        STOP taking    cyclobenzaprine 10 MG tablet  Commonly known as: FLEXERIL              Where to get your medicines      These medications were sent to [facility] [place], [place] - [address]  [address] [facility] [address], [facility] [address]    Phone: [phone]     acetaminophen 325 MG tablet    ibuprofen 600 MG tablet    senna-docusate 8.6-50 MG tablet                 Consultations:   None          Brief Admission History   Estrella Trent is a 37 year old  who presented on 23, postop day 6 after her repeat  section at [facility] with new headache and elevated blood pressures at home. She rates her headache at this time at an 8 out of 10.  Tried Tylenol, ibuprofen, oxycodone at home without much improvement.  Thinks that she is eating and drinking just fine.  Has also been checking her blood pressures at home.  Noticed increases in her blood pressures with her home monitor, including 1 blood pressure with a systolic in the 170s.  She does not have a hypertensive diagnosis outside or inside of pregnancy. No vision changes, right upper quadrant pain, chest pain, shortness of breath, nausea, vomiting.     She does also note some new pain in her right lower extremity.  The pain is anterior medial and has started in the postpartum period.  She also has some numbness that she feels superficially moving about 4 cm of her leg.  No trauma to her ankle.  Had some some swelling in the postpartum period hat has much improved.      section notable for a hemorrhage of 1100, otherwise normal.  She reports that she is recovering well from her  section.  Minimal tenderness at her incision site.  Lochia improving, at less than a period. No history of asthma.           Hospital Course:   On admission, patient was started on a magnesium sulfate infusion for eclamptic seizure prophylaxis.  This was continued for 24 hours, and the patient tolerated it  without issue. There was a malfunction of the pump for a few hours and Mg was not run for a few hours, but the Magnesium was restarted without issue.  Her blood pressures during admission were normotensive, and she was not started on long-acting antihypertensives. On admission, she had a slight elevation in her LFTs.  These were trended and down trended.     The patient also had a right lower extremity tenderness on admission.  A right lower extremity ultrasound was ordered to rule out DVT.  This demonstrated no right lower extremity DVT.     On hospital day 2, she was feeling well.  Her blood pressures were well controlled without antihypertensives.  She was tolerating p.o. without nausea and vomiting.  Patient was having no headache and minimum  incision pain.          Discharge Instructions and Follow-Up:   Discharge diet: Regular   Discharge activity: As tolerated, no lifting > 15lbs for 6 weeks. No driving while taking narcotics.   Discharge follow-up: Follow up in 1 week for BP check and in 6 weeks for postpartum visit.           Discharge Disposition:   Discharged to home      Monica Howell MD  Obstetrics & Gynecology, PGY-4  2023 2:42 PM    Physician Attestation   I saw and evaluated this patient prior to discharge.  I discussed the patient with the resident/fellow and agree with plan of care as documented in the note.      I personally reviewed vital signs, medications and labs.    I personally spent 5 minutes on discharge activities.    Bryanna Murray MD  Date of Service (when I saw the patient): 23

## 2023-07-12 NOTE — PROVIDER NOTIFICATION
07/12/23 1612   Provider Notification   Provider Name/Title Dr. Lopez   Method of Notification Electronic Page   Request Evaluate-Remote   Notification Reason Lab Results     mg level is 3.1  what will be the plan?  08721    1714: Per Dr. Lopez to start pt with the loading dose of Magnesium Sulfate ( 4gram of 100 ml over 30 min) and then to continue with maintenance dose of the 2 grams per hour until the 24 hour time cheyanne of the initial magnesium sulfate infusion load.

## 2023-07-12 NOTE — PLAN OF CARE
Goal Outcome Evaluation:      Plan of Care Reviewed With: patient    Overall Patient Progress: improvingOverall Patient Progress: improving    Pt denies HA, chest pain, SOB, vision change. Has normal reflex and clonus. Reports of occasional right upper quadrant pain. Incisional pain adequately managed with tylenol and PRN oxycodone. Given the bolus dose of 4g/hr of Mg infusion per Dr. Lopez order. Mg is now infusing at 2g/h and LR 75ml/hr. Ambulating with Stand by assist, tolerating regular diet, and voiding without difficulty. pumping independently with encouragement. Parents at bedside, supportive. Continue with plan of care.

## 2023-07-12 NOTE — ED PROVIDER NOTES
ED Provider Note  [facility]      History     Chief Complaint   Patient presents with     Hypertension     PT c/o /103 at home.      Headache     Dizziness     Pt c/o dizzines and HA     HPI  Estrella Trent is a 37 year old  female who underwent  delivery on  at [facility].  Her previous pregnancy had been a twin gestation.  She reports that she began to develop a headache today around 1:00 which is bilateral and worsening.  She has been noticing her blood pressures increasing since the delivery and had a blood pressure of 170/103 today.  She denies change in vision, nausea or vomiting, and states that she did not have any problems with hypertension or preeclampsia associated with this pregnancy or her previous pregnancy.    Past Medical History  No past medical history on file.  Past Surgical History:   Procedure Laterality Date      SECTION       acetaminophen (TYLENOL) 500 MG tablet  cyclobenzaprine (FLEXERIL) 10 MG tablet  Prenatal Vit-Fe Fumarate-FA (PRENATAL MULTIVITAMIN  PLUS IRON) 27-1 MG TABS      No Known Allergies  Family History  No family history on file.  Social History   Social History     Tobacco Use     Smoking status: Never     Smokeless tobacco: Never   Substance Use Topics     Alcohol use: Not Currently     Drug use: Not Currently      Past medical history, past surgical history, medications, allergies, family history, and social history were reviewed with the patient. No additional pertinent items.      A medically appropriate review of systems was performed with pertinent positives and negatives noted in the HPI, and all other systems negative.    Physical Exam   BP: (!) 146/88  Pulse: 65  Temp: 98.5  F (36.9  C)  Resp: 12  SpO2: 99 %  Physical Exam  Vitals and nursing note reviewed.   Constitutional:       General: She is not in acute distress.     Appearance: She is well-developed. She is not ill-appearing,  toxic-appearing or diaphoretic.   HENT:      Head: Normocephalic and atraumatic.      Mouth/Throat:      Lips: Pink.      Mouth: Mucous membranes are moist.      Pharynx: Oropharynx is clear. No oropharyngeal exudate.   Eyes:      General: Lids are normal. No scleral icterus.     Extraocular Movements: Extraocular movements intact.      Right eye: No nystagmus.      Left eye: No nystagmus.      Conjunctiva/sclera: Conjunctivae normal.      Pupils: Pupils are equal, round, and reactive to light.   Neck:      Thyroid: No thyromegaly.      Vascular: No JVD.      Trachea: No tracheal deviation.   Cardiovascular:      Rate and Rhythm: Normal rate and regular rhythm.      Pulses: Normal pulses.      Heart sounds: Normal heart sounds. No murmur heard.     No friction rub. No gallop.   Pulmonary:      Effort: Pulmonary effort is normal. No respiratory distress.      Breath sounds: Normal breath sounds.   Abdominal:      General: Bowel sounds are normal. There is no distension.      Palpations: Abdomen is soft. There is no mass.      Tenderness: There is no abdominal tenderness. There is no guarding or rebound.   Musculoskeletal:         General: No tenderness. Normal range of motion.      Cervical back: Normal range of motion and neck supple. No erythema or rigidity.      Right lower leg: No edema.      Left lower leg: No edema.   Lymphadenopathy:      Cervical: No cervical adenopathy.   Skin:     General: Skin is warm and dry.      Capillary Refill: Capillary refill takes less than 2 seconds.      Coloration: Skin is not pale.      Findings: No erythema or rash.   Neurological:      Mental Status: She is alert and oriented to person, place, and time.      Cranial Nerves: No cranial nerve deficit.      Sensory: No sensory deficit.      Motor: Motor function is intact.   Psychiatric:         Mood and Affect: Mood and affect normal.         Speech: Speech normal.         Behavior: Behavior normal.           ED Course,  Procedures, & Data      Procedures                      Results for orders placed or performed during the hospital encounter of 07/11/23   Comprehensive metabolic panel     Status: Abnormal   Result Value Ref Range    Sodium 138 136 - 145 mmol/L    Potassium 4.0 3.4 - 5.3 mmol/L    Chloride 106 98 - 107 mmol/L    Carbon Dioxide (CO2) 22 22 - 29 mmol/L    Anion Gap 10 7 - 15 mmol/L    Urea Nitrogen 8.9 6.0 - 20.0 mg/dL    Creatinine 0.49 (L) 0.51 - 0.95 mg/dL    Calcium 8.8 8.6 - 10.0 mg/dL    Glucose 92 70 - 99 mg/dL    Alkaline Phosphatase 92 35 - 104 U/L    AST 50 (H) 0 - 45 U/L    ALT 52 (H) 0 - 50 U/L    Protein Total 6.1 (L) 6.4 - 8.3 g/dL    Albumin 3.3 (L) 3.5 - 5.2 g/dL    Bilirubin Total 0.2 <=1.2 mg/dL    GFR Estimate >90 >60 mL/min/1.73m2   Magnesium     Status: Normal   Result Value Ref Range    Magnesium 1.7 1.7 - 2.3 mg/dL   UA with Microscopic reflex to Culture     Status: Abnormal    Specimen: Urine, Midstream   Result Value Ref Range    Color Urine Straw Colorless, Straw, Light Yellow, Yellow    Appearance Urine Clear Clear    Glucose Urine Negative Negative mg/dL    Bilirubin Urine Negative Negative    Ketones Urine Negative Negative mg/dL    Specific Gravity Urine 1.006 1.003 - 1.035    Blood Urine Large (A) Negative    pH Urine 7.0 5.0 - 7.0    Protein Albumin Urine Negative Negative mg/dL    Urobilinogen Urine Normal Normal, 2.0 mg/dL    Nitrite Urine Negative Negative    Leukocyte Esterase Urine Trace (A) Negative    Bacteria Urine Few (A) None Seen /HPF    RBC Urine <1 <=2 /HPF    WBC Urine 4 <=5 /HPF    Squamous Epithelials Urine <1 <=1 /HPF    Narrative    Urine Culture not indicated   CBC with platelets and differential     Status: Abnormal   Result Value Ref Range    WBC Count 5.8 4.0 - 11.0 10e3/uL    RBC Count 3.97 3.80 - 5.20 10e6/uL    Hemoglobin 10.5 (L) 11.7 - 15.7 g/dL    Hematocrit 33.5 (L) 35.0 - 47.0 %    MCV 84 78 - 100 fL    MCH 26.4 (L) 26.5 - 33.0 pg    MCHC 31.3 (L) 31.5 -  36.5 g/dL    RDW 24.3 (H) 10.0 - 15.0 %    Platelet Count 192 150 - 450 10e3/uL    % Neutrophils 65 %    % Lymphocytes 22 %    % Monocytes 8 %    % Eosinophils 2 %    % Basophils 1 %    % Immature Granulocytes 2 %    NRBCs per 100 WBC 0 <1 /100    Absolute Neutrophils 3.8 1.6 - 8.3 10e3/uL    Absolute Lymphocytes 1.3 0.8 - 5.3 10e3/uL    Absolute Monocytes 0.5 0.0 - 1.3 10e3/uL    Absolute Eosinophils 0.1 0.0 - 0.7 10e3/uL    Absolute Basophils 0.0 0.0 - 0.2 10e3/uL    Absolute Immature Granulocytes 0.1 <=0.4 10e3/uL    Absolute NRBCs 0.0 10e3/uL   CBC with platelets differential     Status: Abnormal    Narrative    The following orders were created for panel order CBC with platelets differential.  Procedure                               Abnormality         Status                     ---------                               -----------         ------                     CBC with platelets and d...[[id number]]  Abnormal            Final result                 Please view results for these tests on the individual orders.     Medications   labetalol (NORMODYNE/TRANDATE) injection 20-80 mg (has no administration in time range)   0.9% sodium chloride BOLUS (1,000 mLs Intravenous $New Bag 7/11/23 6562)   prochlorperazine (COMPAZINE) injection 10 mg (10 mg Intravenous $Given 7/11/23 5317)   diphenhydrAMINE (BENADRYL) injection 25 mg (25 mg Intravenous $Given 7/11/23 9019)     Labs Ordered and Resulted from Time of ED Arrival to Time of ED Departure   COMPREHENSIVE METABOLIC PANEL - Abnormal       Result Value    Sodium 138      Potassium 4.0      Chloride 106      Carbon Dioxide (CO2) 22      Anion Gap 10      Urea Nitrogen 8.9      Creatinine 0.49 (*)     Calcium 8.8      Glucose 92      Alkaline Phosphatase 92      AST 50 (*)     ALT 52 (*)     Protein Total 6.1 (*)     Albumin 3.3 (*)     Bilirubin Total 0.2      GFR Estimate >90     ROUTINE UA WITH MICROSCOPIC REFLEX TO CULTURE - Abnormal    Color Urine Straw       Appearance Urine Clear      Glucose Urine Negative      Bilirubin Urine Negative      Ketones Urine Negative      Specific Gravity Urine 1.006      Blood Urine Large (*)     pH Urine 7.0      Protein Albumin Urine Negative      Urobilinogen Urine Normal      Nitrite Urine Negative      Leukocyte Esterase Urine Trace (*)     Bacteria Urine Few (*)     RBC Urine <1      WBC Urine 4      Squamous Epithelials Urine <1     CBC WITH PLATELETS AND DIFFERENTIAL - Abnormal    WBC Count 5.8      RBC Count 3.97      Hemoglobin 10.5 (*)     Hematocrit 33.5 (*)     MCV 84      MCH 26.4 (*)     MCHC 31.3 (*)     RDW 24.3 (*)     Platelet Count 192      % Neutrophils 65      % Lymphocytes 22      % Monocytes 8      % Eosinophils 2      % Basophils 1      % Immature Granulocytes 2      NRBCs per 100 WBC 0      Absolute Neutrophils 3.8      Absolute Lymphocytes 1.3      Absolute Monocytes 0.5      Absolute Eosinophils 0.1      Absolute Basophils 0.0      Absolute Immature Granulocytes 0.1      Absolute NRBCs 0.0     MAGNESIUM - Normal    Magnesium 1.7       US Lower Extremity Venous Duplex Right    (Results Pending)              Assessment & Plan      This patient presented to the emergency department with complaints of headache and high blood pressure in the postpartum period.  Blood pressures were followed and she was noted to have some blood pressures with systolics greater than 160 and diastolics greater than 110.  She was started on labetalol protocol.  Liver function tests appear to be mildly elevated.  Platelet count is not significantly low.  Patient was seen by OB/GYN after I consulted with them and will be admitted to their service for treatment of preeclampsia with severe features.  Blood pressures have been trending down here in the emergency department.  Headache was treated with Benadryl and Compazine and patient was given a dose of Zofran for nausea.    I have reviewed the nursing notes. I have reviewed the findings,  diagnosis, plan and need for follow up with the patient.    New Prescriptions    No medications on file       Final diagnoses:   Preeclampsia in postpartum period - With severe features       Charles ARROYO [facility] EMERGENCY DEPARTMENT  7/11/2023     Charles Nichols MD  07/12/23 0043

## 2023-07-12 NOTE — ED NOTES
Headache started around 1pm today and is currently a 8/10. It's a feeling of intense pressure all over head especially behind the eyes and top the head. Took tylenol, ibuprofen and oxycodone today. Feeling dizzy and unsteady on feet.

## 2023-07-12 NOTE — H&P
History and Physical     Estrella Trent MRN# [id number]   YOB: 1985 Age: 37 year old      Date of Admission: 2023    Primary care provider: No Ref-Primary, Physician      Assessment and Plan:       37 year old  who is POD#6 from a scheduled RLTCS. No hypertensive diagnosis at that time.  Delivery notable for hemorrhage of 1100, otherwise appears uncomplicated per chart review.  New headache at home, not responsive to Tylenol, ibuprofen, oxycodone.  Elevated blood pressures including a severe range blood pressure on home cuff.  Plan to admit for magnesium sulfate infusion and blood pressure observation in the setting of preeclampsia with severe features.    #Preeclampsia with severe features (HA)   Blood pressures low mild to severe range in the emergency department.  Two nonsustained severe range blood pressures.  Patient has no prior diagnosis of a hypertensive disorder, so these elevated blood pressures are new for her.  Does also have new elevation in her LFTs, though not twice the upper limit of normal.  Headache, unresponsive to medication.  Overall, meets criteria for preeclampsia with severe features.  - Serial BP monitoring   -Benadryl, Compazine given in emergency department for headache.  We will continue to monitor this.  Consider imaging as needed.  -Plan to start magnesium sulfate infusion with 4 g load, continue at 2 g/h.  Plan to infuse for 24 hours.  - IV Antihypertensives prn for sustained severe range blood pressures (>160/>110)  -Start long-acting antihypertensives as needed.  Will observe blood pressures once on magnesium  - Labs: HELLP labs ordered for the morning  - Daily weights, strict I&Os     Right lower extremity tenderness  -Lower extremity Doppler/ultrasound to rule out DVT in the postpartum period   -We will plan to hold on SCDs until this has resulted.    # Postpartum cares   - Pain: Tylenol, ibuprofen, as needed oxycodone  - Heme: EBL 1100 from surgery .  Hgb on admission 10.5.  We will continue to monitor.  AM CBC ordered.  -Baby: Discussed options for infant to be at bedside with her if there is another adult present.  Breast pump ordered     Patient discussed with Sirena Armas MD.          Natalie Hernandez MD MPH  OB/Gyn Resident PGY-3  2023  1:23 AM       HPI:     Estrella Trent is a 37 year old  who presents today on postop day 6 after her repeat  section at [facility] with new headache and elevated blood pressures at home.  She rates her headache at this time at an 8 out of 10.  Tried Tylenol, ibuprofen, oxycodone at home without much improvement.  Thinks that she is eating and drinking just fine.  Has also been checking her blood pressures at home.  Noticed increases in her blood pressures with her home monitor, including 1 blood pressure with a systolic in the 170s.  She does not have a hypertensive diagnosis outside or inside of pregnancy.  No vision changes, right upper quadrant pain, chest pain, shortness of breath, nausea, vomiting.    She does also note some new pain in her right lower extremity.  The pain is anterior medial and has started in the postpartum period.  She also has some numbness that she feels superficially moving about 4 cm of her leg.  No trauma to her ankle.  Had some some swelling in the postpartum period hat has much improved.     section notable for a hemorrhage of 1100, otherwise normal.  She reports that she is recovering well from her  section.  Minimal tenderness at her incision site.  Lochia improving, at less than a period. No history of asthma.      OB History:    OB History    Para Term  AB Living   2 1 1 0 0 2   SAB IAB Ectopic Multiple Live Births   0 0 0 1 2      # Outcome Date GA Lbr Kenny/2nd Weight Sex Delivery Anes PTL Lv   2 Current            1 Term                 Prenatal Lab Results:  Lab Results   Component Value Date    CHPCRT Negative 2020    GCPCRT  Negative 2020    HGB 11.7 2022       GBS Status:   No results found for: GBS             Past Medical History:   No past medical history on file.          Past Surgical History:     Past Surgical History:   Procedure Laterality Date      SECTION               Social History:     Social History     Tobacco Use     Smoking status: Never     Smokeless tobacco: Never   Substance Use Topics     Alcohol use: Not Currently             Family History:   No family history on file.          Immunizations:     Immunization History   Administered Date(s) Administered     COVID-19 Monovalent 18+ (Moderna) 2021, 01/15/2022            Allergies:   No Known Allergies          Medications:   (Not in a hospital admission)            Review of Systems & Physical Exam:     The Review of Systems is negative other than noted in the HPI      BP (!) 151/118   Pulse 66   Temp 98.5  F (36.9  C) (Oral)   Resp 12   SpO2 99%   Gen: No acute distress  CV: RRR, nl S1/S2, no murmurs/clicks/gallops  Lungs: CTAB, non-labored breathing  Abd: non-tender, non-distended  Ext: No peripheral extremity edema; 1+ DTR, tenderness and slight redness to anterior lateral right ankle without increased swelling.  Decreased sensation to anterior shin extending approximately 4 cm cephalad to point of tenderness.  5 out of 5 strength bilaterally to flexion and extension.         Data:     Component      Latest Ref Rng 2023  12:06 AM   WBC      4.0 - 11.0 10e3/uL 5.8    RBC Count      3.80 - 5.20 10e6/uL 3.97    Hemoglobin      11.7 - 15.7 g/dL 10.5 (L)    Hematocrit      35.0 - 47.0 % 33.5 (L)    MCV      78 - 100 fL 84    MCH      26.5 - 33.0 pg 26.4 (L)    MCHC      31.5 - 36.5 g/dL 31.3 (L)    RDW      10.0 - 15.0 % 24.3 (H)    Platelet Count      150 - 450 10e3/uL 192    % Neutrophils      % 65    % Lymphocytes      % 22    % Monocytes      % 8    % Eosinophils      % 2    % Basophils      % 1    % Immature Granulocytes       % 2    NRBCs per 100 WBC      <1 /100 0    Absolute Neutrophils      1.6 - 8.3 10e3/uL 3.8    Absolute Lymphocytes      0.8 - 5.3 10e3/uL 1.3    Absolute Monocytes      0.0 - 1.3 10e3/uL 0.5    Absolute Eosinophils      0.0 - 0.7 10e3/uL 0.1    Absolute Basophils      0.0 - 0.2 10e3/uL 0.0    Absolute Immature Granulocytes      <=0.4 10e3/uL 0.1    Absolute NRBCs      10e3/uL 0.0       Component      Latest Ref Rng 7/11/2023  10:33 PM   Sodium      136 - 145 mmol/L 138    Potassium      3.4 - 5.3 mmol/L 4.0    Chloride      98 - 107 mmol/L 106    Carbon Dioxide (CO2)      22 - 29 mmol/L 22    Anion Gap      7 - 15 mmol/L 10    Urea Nitrogen      6.0 - 20.0 mg/dL 8.9    Creatinine      0.51 - 0.95 mg/dL 0.49 (L)    Calcium      8.6 - 10.0 mg/dL 8.8    Glucose      70 - 99 mg/dL 92    Alkaline Phosphatase      35 - 104 U/L 92    AST      0 - 45 U/L 50 (H)    ALT      0 - 50 U/L 52 (H)    Protein Total      6.4 - 8.3 g/dL 6.1 (L)    Albumin      3.5 - 5.2 g/dL 3.3 (L)    Bilirubin Total      <=1.2 mg/dL 0.2    GFR Estimate      >60 mL/min/1.73m2 >90              Physician Attestation   I personally examined and evaluated this patient. I have reviewed her labs, vitals and imaging studies.  I discussed the patient with the resident/fellow and care team,on 7/12/23 and agree with the assessment and plan of care as documented in the note.     Key findings: patient was admitted over night with severe range BP, elevated LFT's and dx of preE with SF.  She is currently on magnesium for 24 hours.  Will repeat labs and monitor BP, treat with antihypertensive if necessary.   RLE dopper was negative.         Estrella Lopez MD  Date of Service (when I saw the patient): 7/12/23

## 2023-07-12 NOTE — PLAN OF CARE
Patient arrived to [facility] unit at 0200 via wheelchair ,with belongings, accompanied by her mother.   Got report from Carmela FLORES in ED via telephone.  Unit and room orientation completed.   Plan is to administer Magnesium infusion.  No concerns a this time. Continue with plan of care.

## 2023-07-12 NOTE — PROGRESS NOTES
Magnesium Check - Postpartum    S:  Patient complained of sore throat/dryness, otherwise has no complaint. Pain well controlled. Tolerating PO, ambulating without any issues, voiding spontaneously, lochia within normal limits. Denies any fever, chills, SOB, chest pain, N/V, headache, vision changes, RUQ pain, dizziness. Com    O:  Patient Vitals for the past 24 hrs:   BP Temp Temp src Pulse Resp SpO2 Weight   23 0755 -- -- -- -- -- -- 82.6 kg (182 lb 1.6 oz)   23 0721 116/77 98  F (36.7  C) Oral 69 18 98 % --   23 0600 120/83 -- -- 102 18 97 % --   23 0505 123/80 -- -- 93 20 94 % --   23 0435 124/85 -- -- 104 18 97 % --   23 0405 118/81 -- -- 98 18 95 % --   23 0350 114/74 -- -- 94 18 95 % --   23 0335 114/63 -- -- 84 22 93 % --   23 0320 122/83 -- -- 97 20 99 % --   23 0305 125/81 -- -- 101 20 93 % --   23 0250 (!) 129/91 -- -- 108 20 99 % --   23 0230 (!) 134/94 98.6  F (37  C) Oral 111 22 98 % --   23 0128 (!) 145/85 98.1  F (36.7  C) Oral 69 16 98 % --   23 0124 (!) 145/85 98.1  F (36.7  C) Oral 69 -- 98 % --   23 0045 130/83 -- -- 68 -- -- --   23 0015 137/87 -- -- 68 19 99 % --   23 2345 (!) 159/95 -- -- 65 -- -- --   23 2330 (!) 154/96 -- -- 65 -- -- --   23 2315 (!) 143/90 -- -- 70 -- -- --   23 2300 (!) 151/118 -- -- 66 -- -- --   23 2245 (!) 148/90 -- -- 66 -- -- --   23 2225 (!) 161/96 -- -- 68 -- -- --   23 2137 (!) 146/88 98.5  F (36.9  C) Oral 65 12 99 % --       Gen: Resting comfortably, NAD  CV: RRR, no murmurs  Resp: Non-labored breathing, CTAB  Abd: Soft, non-tender, fundus below the umbilicus, clean, dry, intact incision with steri strips  Extrem: trace edema BLE, no calf tenderness  Neuro: 2+ patellar reflexes, 2+ bicep reflexes. No clonus.    UOP: 500 mL over 4 hours    HGB  Recent Labs   Lab 23  0006   HGB 10.5*       A/P:  37 year old  who is  POD#7 from a scheduled RLTCS c/b PPH who presented with new headaches and was diagnosed with pre-eclampsia with severe features by blood pressure. Doing well, no signs of Mg toxicity. Last abnormal BP was 7/12 at 0250. BP wnl this AM.     #Preeclampsia with severe features (HA)   -Mg for 24 hrs (7/13 @ 0246AM)   -Serial BP monitoring   - IV Antihypertensives prn for sustained severe range blood pressures (>160/>110)  -Start long-acting antihypertensives as needed.  Will observe blood pressures once on magnesium  - Labs: HELLP (7/12) wnl  - Daily weights, strict I&Os     Right lower extremity tenderness  -Lower extremity Doppler/ultrasound - No DVT  -SCDs.     # Postpartum cares   - Pain: Tylenol, ibuprofen, as needed oxycodone  - Heme: EBL 1100 from surgery 7/5. Hgb on admission 10.5 > 10.0.   -Baby: Discussed options for infant to be at bedside with her if there is another adult present.  Breast pump ordered           Natasha Arredondo MD MPH  Obstetric & Gynecology, PGY-1  July 12, 2023 , 7:41 AM        Physician Attestation   I, Estrella Lopez, personally saw and examined Estrella.  I have reviewed her vitals, labs, and medications. I have discussed the plan of care with the resident.   She is stable on magnesium.  BP currently normal since starting magnesium.   Hemoglobin   Date Value Ref Range Status   07/12/2023 10.0 (L) 11.7 - 15.7 g/dL Final   07/12/2023 10.5 (L) 11.7 - 15.7 g/dL Final   01/01/2020 13.1 11.7 - 15.7 g/dL Final      Recent Labs   Lab 07/11/23  2233   GLC 92   l  Discussed plan with patient and she has no questions.  Possible discharge tomorrow afternoon at the earliest depending on BP's.       Estrella Lopez MD  Date of Service (when I saw the patient): July 12, 2023

## 2023-07-12 NOTE — PLAN OF CARE
Data: Blood pressures have been WDL and she denies headache, vision changes, SOB, RUQ pain. Reflexes +2, no clonus. Magnesium IV infusing at 2g/hr, no s/s toxicity.Other VSS. She is voiding without difficulty, up with stand by assist.  at home but may come visit during the day.     Pt breastfeeding, offered breast pump, pt declined. Pt has sporadic dry cough and reports pain when coughing at her c/s incision. Pt reports she was taking oxycodone for pain at home and would like some here. Oxycodone given x1 this shift.    Action: Education provided on plan of care.    Response: Pt is agreeable with her plan of care. Pt's mother present at bedside.

## 2023-07-12 NOTE — ED NOTES
PT had c section 7/5/23 at [facility]. Pt c/o increased BP at home tonight 170/103 at home. Also c/o HA and dizziness since 1300. Now getting worse. PT c/o face, feet, and arm feeling swollen.

## 2023-07-12 NOTE — PROVIDER NOTIFICATION
07/12/23 1844   Provider Notification   Provider Name/Title G3   Method of Notification Electronic Page   Request Evaluate-Remote   Notification Reason Status Update     FYI: BP is 126/92. repeat BP is 126/88.  Pt reports of intermittent right upper abdominal pain and rating 3 out of 10 and heaviness above her right eye.   No blurry vision or any other vision changes. Denies other symptoms. thanks

## 2023-07-12 NOTE — ED TRIAGE NOTES
Triage Assessment     Row Name 07/11/23 2138       Triage Assessment (Adult)    Airway WDL WDL       Respiratory WDL    Respiratory WDL WDL       Skin Circulation/Temperature WDL    Skin Circulation/Temperature WDL WDL       Cardiac WDL    Cardiac WDL X  HTN       Peripheral/Neurovascular WDL    Peripheral Neurovascular WDL X  HTN       Cognitive/Neuro/Behavioral WDL    Cognitive/Neuro/Behavioral WDL X    Mood/Behavior anxious

## 2023-07-13 VITALS
BODY MASS INDEX: 25.5 KG/M2 | HEART RATE: 73 BPM | WEIGHT: 180.1 LBS | OXYGEN SATURATION: 99 % | TEMPERATURE: 97.4 F | DIASTOLIC BLOOD PRESSURE: 84 MMHG | SYSTOLIC BLOOD PRESSURE: 135 MMHG | RESPIRATION RATE: 16 BRPM

## 2023-07-13 PROBLEM — O14.10 PREECLAMPSIA, SEVERE: Status: ACTIVE | Noted: 2023-07-13

## 2023-07-13 PROCEDURE — 250N000013 HC RX MED GY IP 250 OP 250 PS 637

## 2023-07-13 RX ORDER — IBUPROFEN 600 MG/1
600 TABLET, FILM COATED ORAL EVERY 6 HOURS PRN
Qty: 60 TABLET | Refills: 0 | Status: SHIPPED | OUTPATIENT
Start: 2023-07-13

## 2023-07-13 RX ORDER — AMOXICILLIN 250 MG
1 CAPSULE ORAL DAILY
Qty: 100 TABLET | Refills: 0 | Status: SHIPPED | OUTPATIENT
Start: 2023-07-13

## 2023-07-13 RX ORDER — ACETAMINOPHEN 325 MG/1
650 TABLET ORAL EVERY 6 HOURS PRN
Qty: 100 TABLET | Refills: 0 | Status: SHIPPED | OUTPATIENT
Start: 2023-07-13

## 2023-07-13 RX ADMIN — IBUPROFEN 800 MG: 800 TABLET ORAL at 02:50

## 2023-07-13 RX ADMIN — OXYCODONE HYDROCHLORIDE 5 MG: 5 TABLET ORAL at 06:33

## 2023-07-13 RX ADMIN — IBUPROFEN 800 MG: 800 TABLET ORAL at 09:43

## 2023-07-13 RX ADMIN — ACETAMINOPHEN 975 MG: 325 TABLET, FILM COATED ORAL at 01:11

## 2023-07-13 RX ADMIN — ACETAMINOPHEN 975 MG: 325 TABLET, FILM COATED ORAL at 07:51

## 2023-07-13 RX ADMIN — SENNOSIDES AND DOCUSATE SODIUM 2 TABLET: 50; 8.6 TABLET ORAL at 07:51

## 2023-07-13 RX ADMIN — OXYCODONE HYDROCHLORIDE 5 MG: 5 TABLET ORAL at 09:43

## 2023-07-13 ASSESSMENT — ACTIVITIES OF DAILY LIVING (ADL)
ADLS_ACUITY_SCORE: 20

## 2023-07-13 NOTE — DISCHARGE INSTRUCTIONS
Preeclampsia   Call your doctor right away if you have any of the following:  - Edema (swelling) in your face or hands  - Rapid weight gain-about 1 pound or more in a day  - Headache  - Abdominal pain on your right side  - Vision problems (flashes or spots)  - You have questions or concerns once you return home.

## 2023-07-13 NOTE — PLAN OF CARE
Goal Outcome Evaluation:       VSS. Magnesium infusion was stopped at 0245. Mag checks were WDL. Pt denies visual disturbances and epigastric pain. Pt briefly reported a 5/10 headache that she described as a heavy sensation and pressure on the right side of her head and in her right eye. The headache was resolved with Tylenol. Trace edema is present. Pt received oxycodone for pain at abdomen site at 2105 and 0634; currently denies abdominal pain. I's and O's are charted. Pt voiding adequate amounts. Steri strips are in place; plan is for strips to be removed in the morning. Pt was weighed. IV is saline locked. SCDs are on. Pt breas tpumping. Pt's mother is at bedside. Report given to Lilliana PERLA RN. Continue with plan of care

## 2023-07-13 NOTE — PROGRESS NOTES
Magnesium Check - Postpartum    S:  Patient reports feeling well. Incision pain well controlled with current regiment. Tolerating PO, ambulating without any issues, voiding spontaneously, minimum lochia. Denies any fever, chills, SOB, chest pain, N/V, headache, vision changes, RUQ pain, dizziness. Complains of rash on forehead that she reports showed up after starting magnesium. Patient is concerned about her elevated blood pressures past her baseline, 90s/50s, and wants to know if she will be sent home with antihypertensives.    O:  Patient Vitals for the past 24 hrs:   BP Temp Temp src Pulse Resp SpO2 Weight   07/13/23 0938 135/84 97.4  F (36.3  C) Oral 73 16 99 % --   07/13/23 0625 124/80 98  F (36.7  C) Oral 75 16 99 % --   07/13/23 0304 -- -- -- -- -- -- 81.7 kg (180 lb 1.6 oz)   07/13/23 0247 118/81 97.9  F (36.6  C) Oral 81 -- 98 % --   07/13/23 0201 119/86 -- -- 90 16 98 % --   07/13/23 0103 116/87 -- -- 87 16 97 % --   07/13/23 0000 112/74 -- -- 90 16 97 % --   07/12/23 2258 118/78 98.1  F (36.7  C) Oral -- 16 98 % --   07/12/23 2200 117/71 98.2  F (36.8  C) Oral -- 18 96 % --   07/12/23 2056 110/72 98  F (36.7  C) Oral -- 18 96 % --   07/12/23 1956 122/86 98  F (36.7  C) Oral 86 18 98 % --   07/12/23 1856 121/76 -- -- 96 18 96 % --   07/12/23 1841 126/88 -- -- 84 18 97 % --   07/12/23 1831 (!) 126/92 -- -- 84 20 99 % --   07/12/23 1826 106/85 -- -- 93 19 98 % --   07/12/23 1811 131/77 98  F (36.7  C) Oral 93 15 98 % --   07/12/23 1757 123/77 -- -- -- 16 98 % --   07/12/23 1745 118/85 98.1  F (36.7  C) Oral 77 16 97 % --   07/12/23 1723 121/83 -- -- 74 16 98 % --   07/12/23 1625 (!) 123/91 98.1  F (36.7  C) Oral 78 16 98 % --   07/12/23 1511 118/81 -- -- 77 16 98 % --   07/12/23 1411 119/82 -- -- 84 16 96 % --       Gen: Resting comfortably, NAD, pumping  CV: Regular rate  Resp: Non-labored breathing, CTAB  Abd: Soft, non-tender, fundus below the umbilicus, incision c/d/i  Extrem: trace edema BLE, no calf  tenderness  Neuro: 2+ patellar reflexes  Skin: 1 cm hyperpigmented dry scaly macular rash on forehead.     Intake/Output Summary (Last 24 hours) at 2023 0756  Last data filed at 2023 0620  Gross per 24 hour   Intake 2500 ml   Output 5250 ml   Net -2750 ml       HGB  Recent Labs   Lab 23  0611 23  0006   HGB 10.0* 10.5*       A/P:  37 year old  who is POD#7 from a scheduled RLTCS c/b PPH who presented with new headaches and was diagnosed with pre-eclampsia with severe features by blood pressure. Doing well, blood pressures have been stable and no signs of Mg toxicity. Plan to discharge patient today.  Patient counseled that blood pressure should normalize since the source of her blood pressure elevation is her placenta, which is out after delivery. She is at a higher risk of developing chronic hypertension and would need regular follow ups.     Preeclampsia with severe features (HA)   -Mg for 24 hrs (discontinue  @ 0246AM).   -Magnesium level 3.1.   -Serial BP monitoring. Blood pressures have been largely normal.   -IV Antihypertensives prn for sustained severe range blood pressures (>160/>110)  -Start long-acting antihypertensives as needed.  - Labs: HELLP initially with elevation to LFTs, repeat early this AM wnl. Will plan to repeat prn.   - Daily weights, strict I&Os      Right lower extremity tenderness, resolved   -Lower extremity Doppler/ultrasound - No DVT  -SCDs in place     Postpartum cares   - Pain: Tylenol, ibuprofen, as needed oxycodone  - Heme: EBL 1100 from surgery . Hgb on admission 10.5 > 10.0.   - Baby: Discussed options for infant to be at bedside with her if there is another adult present; this is difficult because that caretaker is also taking care of her twins at home.         Rash  -Counseled patient that Mg doesn't cause a scaly rash, most likely eczema or dry skin. See above skin findings.   -If bothersome, can apply hydrocortisone cream on rash.      Discharge: home today    Natasha Arredondo MD MPH  Obstetric & Gynecology, PGY-1  July 13, 2023 , 6:56 AM  \    Physician Attestation   I personally examined and evaluated this patient.  I discussed the patient with the resident/fellow and care team, and agree with the assessment and plan of care as documented in the note.     Key findings: s/p 24 hours magnesium.  1+ edema bilaterally. Patient denies any headache, changes in vision, chest pain, chest pressure, right upper quadrant pain, or shortness of breath.  BPs normal range.  Discussed close follow up with her primary OBGYN clinic with BP check early next week.  Patient see Sara Lee MD at [facility].  States understanding of follow up recommendations.    Bryanna Murray MD  Date of Service (when I saw the patient): 07/13/23

## 2023-07-13 NOTE — PLAN OF CARE
VSS, assessments WNL. Blood pressures have been WNL, Pt denies other s/s of pre-e. Intake and output WNL. Discharge instructions given, questions/concerns addressed. Discharge to home.

## 2023-07-13 NOTE — PROGRESS NOTES
Magnesium Check - Postpartum    S:  Patient feels quite well. Pain well controlled, minimal incisional pain. Tolerating PO, ambulating without any issues, voiding spontaneously, lochia within normal limits. Denies any fever, chills, SOB, chest pain, N/V, headache, vision changes, RUQ pain, dizziness. Has not been able to see baby girl since admission. Hopeful she will be able to discharge without much need for blood pressure management after magnesium comes off.     O:  Patient Vitals for the past 24 hrs:   BP Temp Temp src Pulse Resp SpO2 Weight   07/12/23 2258 118/78 98.1  F (36.7  C) Oral -- 16 98 % --   07/12/23 2200 117/71 98.2  F (36.8  C) Oral -- 18 96 % --   07/12/23 2056 110/72 98  F (36.7  C) Oral -- 18 96 % --   07/12/23 1956 122/86 98  F (36.7  C) Oral 86 18 98 % --   07/12/23 1856 121/76 -- -- 96 18 96 % --   07/12/23 1841 126/88 -- -- 84 18 97 % --   07/12/23 1831 (!) 126/92 -- -- 84 20 99 % --   07/12/23 1826 106/85 -- -- 93 19 98 % --   07/12/23 1811 131/77 98  F (36.7  C) Oral 93 15 98 % --   07/12/23 1757 123/77 -- -- -- 16 98 % --   07/12/23 1745 118/85 98.1  F (36.7  C) Oral 77 16 97 % --   07/12/23 1723 121/83 -- -- 74 16 98 % --   07/12/23 1625 (!) 123/91 98.1  F (36.7  C) Oral 78 16 98 % --   07/12/23 1511 118/81 -- -- 77 16 98 % --   07/12/23 1411 119/82 -- -- 84 16 96 % --   07/12/23 1316 131/81 -- -- 66 16 98 % --   07/12/23 1217 (!) 128/90 -- -- 70 16 97 % --   07/12/23 1116 121/79 -- -- 69 18 98 % --   07/12/23 1023 133/86 -- -- 62 18 98 % --   07/12/23 0937 119/83 -- -- 75 18 96 % --   07/12/23 0755 -- -- -- -- -- -- 82.6 kg (182 lb 1.6 oz)   07/12/23 0721 116/77 98  F (36.7  C) Oral 69 18 98 % --   07/12/23 0600 120/83 -- -- 102 18 97 % --   07/12/23 0505 123/80 -- -- 93 20 94 % --   07/12/23 0435 124/85 -- -- 104 18 97 % --   07/12/23 0405 118/81 -- -- 98 18 95 % --   07/12/23 0350 114/74 -- -- 94 18 95 % --   07/12/23 0335 114/63 -- -- 84 22 93 % --   07/12/23 0320 122/83 -- -- 97 20  99 % --   23 0305 125/81 -- -- 101 20 93 % --   23 0250 (!) 129/91 -- -- 108 20 99 % --   23 0230 (!) 134/94 98.6  F (37  C) Oral 111 22 98 % --   23 0128 (!) 145/85 98.1  F (36.7  C) Oral 69 16 98 % --   23 0124 (!) 145/85 98.1  F (36.7  C) Oral 69 -- 98 % --   23 0045 130/83 -- -- 68 -- -- --   23 0015 137/87 -- -- 68 19 99 % --   23 2345 (!) 159/95 -- -- 65 -- -- --   23 2330 (!) 154/96 -- -- 65 -- -- --       Gen: Resting comfortably, NAD  CV: RRR, no murmurs  Resp: Non-labored breathing, CTAB  Abd: Soft, non-tender, fundus below the umbilicus, clean, dry, intact incision with steri strips   Extrem: trace edema BLE  Neuro: 2+ patellar reflexes    UOP:  Intake/Output Summary (Last 24 hours) at 2023 2249  Last data filed at 2023 2134  Gross per 24 hour   Intake 2148 ml   Output 4125 ml   Net -1977 ml       Component      Latest Ref Rng 2023  6:11 AM 2023  3:42 PM   WBC      4.0 - 11.0 10e3/uL 5.3     RBC Count      3.80 - 5.20 10e6/uL 3.74 (L)     Hemoglobin      11.7 - 15.7 g/dL 10.0 (L)     Hematocrit      35.0 - 47.0 % 31.8 (L)     MCV      78 - 100 fL 85     MCH      26.5 - 33.0 pg 26.7     MCHC      31.5 - 36.5 g/dL 31.4 (L)     RDW      10.0 - 15.0 % 24.0 (H)     Platelet Count      150 - 450 10e3/uL 197     Creatinine      0.51 - 0.95 mg/dL 0.46 (L)     GFR Estimate      >60 mL/min/1.73m2 >90     AST      0 - 45 U/L 42     ALT      0 - 50 U/L 46     Magnesium      1.7 - 2.3 mg/dL  3.1 (H)         A/P:  37 year old  who is POD#6 from a scheduled RLTCS c/b PPH who presented with new headaches and was diagnosed with pre-eclampsia with severe features by blood pressure. Doing well, no signs of Mg toxicity.     #Preeclampsia with severe features (HA)   -Mg for 24 hrs ( @ 0246AM). Issue with pump malfunction throughout the day today so magnesium infusion was reloaded at 4gat 1742 and is now on continuous rate at 2g/hr. Magnesium  level was ordered in the context of this confusion/malfunction, level 3.1. Ordered to discontinue at 24 hours from start (0245 on 7/13, ordered).   -Serial BP monitoring. Blood pressures have been largely normal.   - IV Antihypertensives prn for sustained severe range blood pressures (>160/>110)   -Start long-acting antihypertensives as needed.  - Labs: HELLP initially with elevation to LFTs, repeat early this AM wnl. Will plan to repeat prn.   - Daily weights, strict I&Os     Right lower extremity tenderness, resolved   -Lower extremity Doppler/ultrasound - No DVT  -SCDs in place     # Postpartum cares   - Pain: Tylenol, ibuprofen, as needed oxycodone  - Heme: EBL 1100 from surgery 7/5. Hgb on admission 10.5 > 10.0.   - Incision: discussed with bedside RN that the day team can help to remove steri strips in the AM  - Baby: Discussed options for infant to be at bedside with her if there is another adult present; this is difficult because that caretaker is also taking care of her twins at home.  Breast pump ordered           Natalie Hernandez MD MPH  OB/Gyn Resident PGY-3  7/12/2023

## 2023-07-14 ENCOUNTER — HOSPITAL ENCOUNTER (EMERGENCY)
Facility: CLINIC | Age: 38
Discharge: HOME | End: 2023-07-14
Attending: EMERGENCY MEDICINE | Admitting: EMERGENCY MEDICINE
Payer: COMMERCIAL

## 2023-07-14 VITALS
DIASTOLIC BLOOD PRESSURE: 103 MMHG | OXYGEN SATURATION: 98 % | HEART RATE: 71 BPM | SYSTOLIC BLOOD PRESSURE: 142 MMHG | RESPIRATION RATE: 18 BRPM | TEMPERATURE: 98.8 F

## 2023-07-14 LAB
ALBUMIN SERPL BCG-MCNC: 3.6 G/DL (ref 3.5–5.2)
ALP SERPL-CCNC: 91 U/L (ref 35–104)
ALT SERPL W P-5'-P-CCNC: 29 U/L (ref 0–50)
ANION GAP SERPL CALCULATED.3IONS-SCNC: 12 MMOL/L (ref 7–15)
AST SERPL W P-5'-P-CCNC: 24 U/L (ref 0–45)
BASOPHILS # BLD AUTO: 0 10E3/UL (ref 0–0.2)
BASOPHILS NFR BLD AUTO: 0 %
BILIRUB SERPL-MCNC: 0.2 MG/DL
BUN SERPL-MCNC: 12 MG/DL (ref 6–20)
CALCIUM SERPL-MCNC: 9 MG/DL (ref 8.6–10)
CHLORIDE SERPL-SCNC: 105 MMOL/L (ref 98–107)
CREAT SERPL-MCNC: 0.48 MG/DL (ref 0.51–0.95)
DEPRECATED HCO3 PLAS-SCNC: 22 MMOL/L (ref 22–29)
EOSINOPHIL # BLD AUTO: 0.1 10E3/UL (ref 0–0.7)
EOSINOPHIL NFR BLD AUTO: 2 %
ERYTHROCYTE [DISTWIDTH] IN BLOOD BY AUTOMATED COUNT: 24 % (ref 10–15)
GFR SERPL CREATININE-BSD FRML MDRD: >90 ML/MIN/1.73M2
GLUCOSE SERPL-MCNC: 104 MG/DL (ref 70–99)
HCT VFR BLD AUTO: 38 % (ref 35–47)
HGB BLD-MCNC: 11.8 G/DL (ref 11.7–15.7)
HOLD SPECIMEN: NORMAL
HOLD SPECIMEN: NORMAL
IMM GRANULOCYTES # BLD: 0 10E3/UL
IMM GRANULOCYTES NFR BLD: 1 %
LYMPHOCYTES # BLD AUTO: 1.2 10E3/UL (ref 0.8–5.3)
LYMPHOCYTES NFR BLD AUTO: 21 %
MAGNESIUM SERPL-MCNC: 1.5 MG/DL (ref 1.7–2.3)
MCH RBC QN AUTO: 26.7 PG (ref 26.5–33)
MCHC RBC AUTO-ENTMCNC: 31.1 G/DL (ref 31.5–36.5)
MCV RBC AUTO: 86 FL (ref 78–100)
MONOCYTES # BLD AUTO: 0.4 10E3/UL (ref 0–1.3)
MONOCYTES NFR BLD AUTO: 7 %
NEUTROPHILS # BLD AUTO: 3.9 10E3/UL (ref 1.6–8.3)
NEUTROPHILS NFR BLD AUTO: 69 %
NRBC # BLD AUTO: 0 10E3/UL
NRBC BLD AUTO-RTO: 0 /100
PLATELET # BLD AUTO: 228 10E3/UL (ref 150–450)
POTASSIUM SERPL-SCNC: 4 MMOL/L (ref 3.4–5.3)
PROT SERPL-MCNC: 6.6 G/DL (ref 6.4–8.3)
RBC # BLD AUTO: 4.42 10E6/UL (ref 3.8–5.2)
SODIUM SERPL-SCNC: 139 MMOL/L (ref 136–145)
WBC # BLD AUTO: 5.7 10E3/UL (ref 4–11)

## 2023-07-14 PROCEDURE — 99284 EMERGENCY DEPT VISIT MOD MDM: CPT | Performed by: EMERGENCY MEDICINE

## 2023-07-14 PROCEDURE — 250N000013 HC RX MED GY IP 250 OP 250 PS 637: Performed by: EMERGENCY MEDICINE

## 2023-07-14 PROCEDURE — 250N000011 HC RX IP 250 OP 636: Mod: JZ | Performed by: EMERGENCY MEDICINE

## 2023-07-14 PROCEDURE — 85018 HEMOGLOBIN: CPT | Performed by: EMERGENCY MEDICINE

## 2023-07-14 PROCEDURE — 99284 EMERGENCY DEPT VISIT MOD MDM: CPT | Mod: 25 | Performed by: EMERGENCY MEDICINE

## 2023-07-14 PROCEDURE — 83735 ASSAY OF MAGNESIUM: CPT | Performed by: EMERGENCY MEDICINE

## 2023-07-14 PROCEDURE — 96374 THER/PROPH/DIAG INJ IV PUSH: CPT | Performed by: EMERGENCY MEDICINE

## 2023-07-14 PROCEDURE — 36415 COLL VENOUS BLD VENIPUNCTURE: CPT | Performed by: EMERGENCY MEDICINE

## 2023-07-14 PROCEDURE — 80053 COMPREHEN METABOLIC PANEL: CPT | Performed by: EMERGENCY MEDICINE

## 2023-07-14 PROCEDURE — 96375 TX/PRO/DX INJ NEW DRUG ADDON: CPT | Performed by: EMERGENCY MEDICINE

## 2023-07-14 RX ORDER — KETOROLAC TROMETHAMINE 15 MG/ML
10 INJECTION, SOLUTION INTRAMUSCULAR; INTRAVENOUS ONCE
Status: COMPLETED | OUTPATIENT
Start: 2023-07-14 | End: 2023-07-14

## 2023-07-14 RX ORDER — MAGNESIUM OXIDE 400 MG/1
400 TABLET ORAL DAILY
Status: DISCONTINUED | OUTPATIENT
Start: 2023-07-15 | End: 2023-07-14

## 2023-07-14 RX ORDER — ACETAMINOPHEN 325 MG/1
975 TABLET ORAL ONCE
Status: COMPLETED | OUTPATIENT
Start: 2023-07-14 | End: 2023-07-14

## 2023-07-14 RX ORDER — NIFEDIPINE 30 MG
30 TABLET, EXTENDED RELEASE ORAL DAILY
Qty: 30 TABLET | Refills: 0 | Status: SHIPPED | OUTPATIENT
Start: 2023-07-14

## 2023-07-14 RX ORDER — NIFEDIPINE 30 MG/1
30 TABLET, EXTENDED RELEASE ORAL DAILY
Status: DISCONTINUED | OUTPATIENT
Start: 2023-07-14 | End: 2023-07-15 | Stop reason: HOSPADM

## 2023-07-14 RX ORDER — MAGNESIUM OXIDE 400 MG/1
400 TABLET ORAL ONCE
Status: DISCONTINUED | OUTPATIENT
Start: 2023-07-14 | End: 2023-07-15 | Stop reason: HOSPADM

## 2023-07-14 RX ADMIN — KETOROLAC TROMETHAMINE 15 MG: 15 INJECTION, SOLUTION INTRAMUSCULAR; INTRAVENOUS at 21:11

## 2023-07-14 RX ADMIN — NIFEDIPINE 30 MG: 30 TABLET, FILM COATED, EXTENDED RELEASE ORAL at 21:07

## 2023-07-14 RX ADMIN — ACETAMINOPHEN 975 MG: 325 TABLET, FILM COATED ORAL at 21:07

## 2023-07-14 RX ADMIN — PROCHLORPERAZINE EDISYLATE 10 MG: 5 INJECTION INTRAMUSCULAR; INTRAVENOUS at 21:12

## 2023-07-14 ASSESSMENT — ACTIVITIES OF DAILY LIVING (ADL): ADLS_ACUITY_SCORE: 33

## 2023-07-15 ENCOUNTER — PATIENT OUTREACH (OUTPATIENT)
Dept: CARE COORDINATION | Facility: CLINIC | Age: 38
End: 2023-07-15
Payer: COMMERCIAL

## 2023-07-15 NOTE — ED TRIAGE NOTES
Patient reports that she has high blood pressure with a headache.   Patient had a baby via  on  and was hospitalized with pre-eclampsia. Patient was discharged yesterday.  Blood pressure in triage is 152/98.

## 2023-07-15 NOTE — DISCHARGE INSTRUCTIONS
Follow-up with your OB/GYN physician.  Return to the emergency department as needed for any new or worsening symptoms.

## 2023-07-15 NOTE — ED PROVIDER NOTES
[facility] EMERGENCY DEPARTMENT ([facility])    23       History     Chief Complaint   Patient presents with     Pre-Eclampsia     Patient was discharged from the hospital yesterday, now has high blood pressure with a headache again. BP elevated in triage.      The history is provided by the patient and medical records.     Estrella Trent is a 37 year old female who with PMH of [place], iron deficiency anemia secondary to inadequate iron intake, and preeclampsia presents to the ED with pre-eclampsia.   Patient reports that her blood pressure has been high. She also has headache and dizziness present. She had taken tylenol for her headache and she had not taken any medications for her high blood pressure. No nausea or vomiting. No fever or cough.  She was discharged yesterday from labor and delivery early after being treated for several days for postpartum preeclampsia.  She was discharged without any further blood pressure meds.    Per Epic Review:   BP Readings from Last 6 Encounters:   23 (!) 142/103   23 135/84   23 109/66   22 118/88   20 113/82   19 111/61       Past Medical History  No past medical history on file.  Past Surgical History:   Procedure Laterality Date      SECTION       NIFEdipine ER (ADALAT CC) 30 MG 24 hr tablet  acetaminophen (TYLENOL) 325 MG tablet  ibuprofen (ADVIL/MOTRIN) 600 MG tablet  Prenatal Vit-Fe Fumarate-FA (PRENATAL MULTIVITAMIN  PLUS IRON) 27-1 MG TABS  senna-docusate (SENOKOT-S/PERICOLACE) 8.6-50 MG tablet      No Known Allergies  Family History  No family history on file.  Social History   Social History     Tobacco Use     Smoking status: Never     Smokeless tobacco: Never   Substance Use Topics     Alcohol use: Not Currently     Drug use: Not Currently      Past medical history, past surgical history, medications, allergies, family history, and social history were reviewed with the patient. No additional pertinent  items.      A complete review of systems was performed with pertinent positives and negatives noted in the HPI, and all other systems negative.    Physical Exam   BP: (!) 152/98  Pulse: 61  Temp: 98.8  F (37.1  C)  Resp: 18  SpO2: 98 %  Physical Exam  Constitutional:       General: She is not in acute distress.     Appearance: She is not diaphoretic.   HENT:      Head: Normocephalic and atraumatic.      Right Ear: External ear normal.      Left Ear: External ear normal.      Nose: Nose normal.   Eyes:      Extraocular Movements: Extraocular movements intact.      Conjunctiva/sclera: Conjunctivae normal.   Cardiovascular:      Rate and Rhythm: Normal rate and regular rhythm.      Heart sounds: Normal heart sounds.   Pulmonary:      Effort: Pulmonary effort is normal. No respiratory distress.      Breath sounds: Normal breath sounds.   Abdominal:      General: There is no distension.      Palpations: Abdomen is soft.      Tenderness: There is no abdominal tenderness.   Musculoskeletal:         General: No swelling or deformity.      Cervical back: Normal range of motion and neck supple.   Skin:     General: Skin is warm and dry.   Neurological:      Mental Status: Mental status is at baseline.      Comments: Alert, oriented   Psychiatric:         Mood and Affect: Mood normal.         Behavior: Behavior normal.         ED Course, Procedures, & Data     8:15 PM  The patient was seen and examined by Anand Fish DO.  in Room ED 20.   Procedures          Results for orders placed or performed during the hospital encounter of 07/14/23   Comprehensive metabolic panel     Status: Abnormal   Result Value Ref Range    Sodium 139 136 - 145 mmol/L    Potassium 4.0 3.4 - 5.3 mmol/L    Chloride 105 98 - 107 mmol/L    Carbon Dioxide (CO2) 22 22 - 29 mmol/L    Anion Gap 12 7 - 15 mmol/L    Urea Nitrogen 12.0 6.0 - 20.0 mg/dL    Creatinine 0.48 (L) 0.51 - 0.95 mg/dL    Calcium 9.0 8.6 - 10.0 mg/dL    Glucose 104 (H) 70 - 99  mg/dL    Alkaline Phosphatase 91 35 - 104 U/L    AST 24 0 - 45 U/L    ALT 29 0 - 50 U/L    Protein Total 6.6 6.4 - 8.3 g/dL    Albumin 3.6 3.5 - 5.2 g/dL    Bilirubin Total 0.2 <=1.2 mg/dL    GFR Estimate >90 >60 mL/min/1.73m2   Magnesium     Status: Abnormal   Result Value Ref Range    Magnesium 1.5 (L) 1.7 - 2.3 mg/dL   CBC with platelets and differential     Status: Abnormal   Result Value Ref Range    WBC Count 5.7 4.0 - 11.0 10e3/uL    RBC Count 4.42 3.80 - 5.20 10e6/uL    Hemoglobin 11.8 11.7 - 15.7 g/dL    Hematocrit 38.0 35.0 - 47.0 %    MCV 86 78 - 100 fL    MCH 26.7 26.5 - 33.0 pg    MCHC 31.1 (L) 31.5 - 36.5 g/dL    RDW 24.0 (H) 10.0 - 15.0 %    Platelet Count 228 150 - 450 10e3/uL    % Neutrophils 69 %    % Lymphocytes 21 %    % Monocytes 7 %    % Eosinophils 2 %    % Basophils 0 %    % Immature Granulocytes 1 %    NRBCs per 100 WBC 0 <1 /100    Absolute Neutrophils 3.9 1.6 - 8.3 10e3/uL    Absolute Lymphocytes 1.2 0.8 - 5.3 10e3/uL    Absolute Monocytes 0.4 0.0 - 1.3 10e3/uL    Absolute Eosinophils 0.1 0.0 - 0.7 10e3/uL    Absolute Basophils 0.0 0.0 - 0.2 10e3/uL    Absolute Immature Granulocytes 0.0 <=0.4 10e3/uL    Absolute NRBCs 0.0 10e3/uL   [place] Draw     Status: None    Narrative    The following orders were created for panel order [place] Draw.  Procedure                               Abnormality         Status                     ---------                               -----------         ------                     Extra Blue Top Tube[[id number]]                              Final result               Extra Red Top Tube[[id number]]                               Final result                 Please view results for these tests on the individual orders.   Extra Blue Top Tube     Status: None   Result Value Ref Range    Hold Specimen JIC    Extra Red Top Tube     Status: None   Result Value Ref Range    Hold Specimen JIC    CBC with platelets differential     Status: Abnormal    Narrative    The  following orders were created for panel order CBC with platelets differential.  Procedure                               Abnormality         Status                     ---------                               -----------         ------                     CBC with platelets and d...[[id number]]  Abnormal            Final result                 Please view results for these tests on the individual orders.     Medications   NIFEdipine ER OSMOTIC (PROCARDIA XL) 24 hr tablet 30 mg (30 mg Oral $Given 7/14/23 2107)   magnesium oxide (MAG-OX) tablet 400 mg (has no administration in time range)   acetaminophen (TYLENOL) tablet 975 mg (975 mg Oral $Given 7/14/23 2107)   prochlorperazine (COMPAZINE) injection 10 mg (10 mg Intravenous $Given 7/14/23 2112)   ketorolac (TORADOL) injection 10 mg (15 mg Intravenous $Given 7/14/23 2111)     Labs Ordered and Resulted from Time of ED Arrival to Time of ED Departure   COMPREHENSIVE METABOLIC PANEL - Abnormal       Result Value    Sodium 139      Potassium 4.0      Chloride 105      Carbon Dioxide (CO2) 22      Anion Gap 12      Urea Nitrogen 12.0      Creatinine 0.48 (*)     Calcium 9.0      Glucose 104 (*)     Alkaline Phosphatase 91      AST 24      ALT 29      Protein Total 6.6      Albumin 3.6      Bilirubin Total 0.2      GFR Estimate >90     MAGNESIUM - Abnormal    Magnesium 1.5 (*)    CBC WITH PLATELETS AND DIFFERENTIAL - Abnormal    WBC Count 5.7      RBC Count 4.42      Hemoglobin 11.8      Hematocrit 38.0      MCV 86      MCH 26.7      MCHC 31.1 (*)     RDW 24.0 (*)     Platelet Count 228      % Neutrophils 69      % Lymphocytes 21      % Monocytes 7      % Eosinophils 2      % Basophils 0      % Immature Granulocytes 1      NRBCs per 100 WBC 0      Absolute Neutrophils 3.9      Absolute Lymphocytes 1.2      Absolute Monocytes 0.4      Absolute Eosinophils 0.1      Absolute Basophils 0.0      Absolute Immature Granulocytes 0.0      Absolute NRBCs 0.0     ROUTINE UA WITH  MICROSCOPIC REFLEX TO CULTURE     No orders to display         Medical Decision Making  The patient's presentation is strongly suggestive of high complexity (an acute health issue posing potential threat to life or bodily function).    The patient's evaluation involved:  review of external note(s) from 3+ sources (Most recent H&P in addition to clinic and ED note)  review of 2 test result(s) ordered prior to this encounter (Most recent BMP and CBC)  Management discussed with another healthcare provider the OB/GYN resident.    The patient's management involved moderate risk (prescription drug management including medications given in the ED).      Assessment & Plan    37-year-old female presents to us with a chief complaint of preeclampsia symptoms and elevated blood pressure.  Pressure was elevated initially here at 152/98.  Labs were ordered.  Patient was given medication for headache as well as fluids.  I did discuss with OB/GYN resident on-call.  She recommended attempting to get the headache and symptoms under control.  Assuming this can be done she recommended discharge on 30 mg XL nifedipine.  I did give her a dose of this as well today.  Labs are reassuring.  Patient's headache resolved and her blood pressure improved.  We will discharge her with terbinafine as recommended.    I have reviewed the nursing notes. I have reviewed the findings, diagnosis, plan and need for follow up with the patient.    New Prescriptions    NIFEDIPINE ER (ADALAT CC) 30 MG 24 HR TABLET    Take 1 tablet (30 mg) by mouth daily       Final diagnoses:   Preeclampsia in postpartum period   I, LAINA RODRIGUEZ, am serving as a trained medical scribe to document services personally performed by Anand Fish DO, based on the provider's statements to me.     I, Anand Fish DO, was physically present and have reviewed and verified the accuracy of this note documented by LAINA RODRIGUEZ.    Anand Fish DO.     M  [facility] EMERGENCY DEPARTMENT  7/14/2023     Anand Fish DO  07/14/23 5717

## 2023-07-15 NOTE — PROGRESS NOTES
Clinic Care Coordination Contact  [facility]: Post-Discharge Note  SITUATION                                                      Admission:    Admission Date: 23   Reason for Admission: Maternity care  Discharge:   Discharge Date: 23  Discharge Diagnosis: Maternity care    BACKGROUND                                                      Per hospital discharge summary and inpatient provider notes:    Estrella Trent is a 37 year old  who presents today on postop day 6 after her repeat  section at [facility] with new headache and elevated blood pressures at home.  She rates her headache at this time at an 8 out of 10.  Tried Tylenol, ibuprofen, oxycodone at home without much improvement.  Thinks that she is eating and drinking just fine.  Has also been checking her blood pressures at home.  Noticed increases in her blood pressures with her home monitor, including 1 blood pressure with a systolic in the 170s.  She does not have a hypertensive diagnosis outside or inside of pregnancy.  No vision changes, right upper quadrant pain, chest pain, shortness of breath, nausea, vomiting.     ASSESSMENT           Discharge Assessment  How are you doing now that you are home?: I am doing okay. I had a headache  How are your symptoms? (Red Flag symptoms escalate to triage hotline per guidelines): Unchanged  Do you feel your condition is stable enough to be safe at home until your provider visit?: Yes  Does the patient have their discharge instructions? : Yes  Does the patient have questions regarding their discharge instructions? : No  Were you started on any new medications or were there changes to any of your previous medications? : Yes  Does the patient have all of their medications?: Yes  Do you have questions regarding any of your medications? : No  Do you have all of your needed medical supplies or equipment (DME)?  (i.e. oxygen tank, CPAP, cane, etc.): Yes  Discharge follow-up appointment  scheduled within 14 calendar days? : Yes (Pt is going to see if she can be seen sooner with her provider)  Discharge Follow Up Appointment Date: 08/11/23  Discharge Follow Up Appointment Scheduled with?: Specialty Care Provider                PLAN                                                      Outpatient Plan: Please make an appointment with your primary OBGYN office at [facility] for Monday 7/17/23 with Dr. Sara Lee or one of her partners. This will be for a blood pressure check.  Severe preeclampsia follow up  Follow up in clinic or with home care within 3 days for blood pressure check    No future appointments.      For any urgent concerns, please contact our 24 hour nurse triage line: [phone] ([phone])       FARHAD Renee  [phone]  Connected Care Resource [facility]